# Patient Record
Sex: FEMALE | Race: WHITE | Employment: FULL TIME | URBAN - METROPOLITAN AREA
[De-identification: names, ages, dates, MRNs, and addresses within clinical notes are randomized per-mention and may not be internally consistent; named-entity substitution may affect disease eponyms.]

---

## 2018-02-06 ENCOUNTER — OFFICE VISIT (OUTPATIENT)
Dept: FAMILY MEDICINE CLINIC | Facility: CLINIC | Age: 33
End: 2018-02-06

## 2018-02-06 VITALS
HEIGHT: 67 IN | WEIGHT: 255 LBS | TEMPERATURE: 99 F | OXYGEN SATURATION: 99 % | HEART RATE: 78 BPM | SYSTOLIC BLOOD PRESSURE: 130 MMHG | RESPIRATION RATE: 20 BRPM | DIASTOLIC BLOOD PRESSURE: 64 MMHG | BODY MASS INDEX: 40.02 KG/M2

## 2018-02-06 DIAGNOSIS — J02.9 SORE THROAT: Primary | ICD-10-CM

## 2018-02-06 DIAGNOSIS — J01.00 ACUTE MAXILLARY SINUSITIS, RECURRENCE NOT SPECIFIED: ICD-10-CM

## 2018-02-06 LAB
CONTROL LINE PRESENT WITH A CLEAR BACKGROUND (YES/NO): YES YES/NO
STREP GRP A CUL-SCR: NEGATIVE

## 2018-02-06 PROCEDURE — 99213 OFFICE O/P EST LOW 20 MIN: CPT | Performed by: NURSE PRACTITIONER

## 2018-02-06 PROCEDURE — 87880 STREP A ASSAY W/OPTIC: CPT | Performed by: NURSE PRACTITIONER

## 2018-02-06 RX ORDER — LOSARTAN POTASSIUM 100 MG/1
100 TABLET ORAL DAILY
COMMUNITY

## 2018-02-06 RX ORDER — DOXYCYCLINE HYCLATE 100 MG/1
100 CAPSULE ORAL 2 TIMES DAILY
Qty: 14 CAPSULE | Refills: 0 | Status: SHIPPED | OUTPATIENT
Start: 2018-02-06 | End: 2018-02-13

## 2018-02-06 RX ORDER — POTASSIUM CHLORIDE 750 MG/1
10 TABLET, EXTENDED RELEASE ORAL DAILY
COMMUNITY

## 2018-02-06 NOTE — PATIENT INSTRUCTIONS
·  PLAN: Doxycycline, take as directed. Finish all the medication even if you feel better. · Probiotics or yogurt daily during antibiotic use will help decrease stomach upset and restore good bacteria to the gut.   · May start Claritin or Allegra daily to

## 2018-02-06 NOTE — PROGRESS NOTES
HPI:   Karlene Grewal is a 29year old female who presents with ill symptoms for  2  weeks. Patient reports sore throat, congestion, low grade fever, OTC cold meds have not been helping. Menstrual cycle started earlier than normal, day 11 in cycle.   Has 02/06/18 11:01 AM   Result Value Ref Range   STREP GRP A CUL-SCR negative Negative   Control Line Present with a clear background (yes/no) yes Yes/No   Kit Lot # [id number] Numeric   Kit Expiration Date 08/31/2018 Date         ASSESSMENT AND PLAN:    PLAN: or Ibuprofen over the counter for pain/comfort if not contraindicated. · Follow up in 2 weeks with Dr. Cherylene League, or sooner if worsening symptoms. Follow up if menstrual cycle does not return to \"normal\".  It is normal to have an occasional irregular menses

## 2018-04-06 ENCOUNTER — NURSE ONLY (OUTPATIENT)
Dept: FAMILY MEDICINE CLINIC | Facility: CLINIC | Age: 33
End: 2018-04-06

## 2018-04-06 VITALS
HEART RATE: 89 BPM | TEMPERATURE: 99 F | OXYGEN SATURATION: 99 % | BODY MASS INDEX: 40.02 KG/M2 | DIASTOLIC BLOOD PRESSURE: 70 MMHG | WEIGHT: 255 LBS | RESPIRATION RATE: 20 BRPM | SYSTOLIC BLOOD PRESSURE: 114 MMHG | HEIGHT: 67 IN

## 2018-04-06 DIAGNOSIS — J06.9 URI WITH COUGH AND CONGESTION: Primary | ICD-10-CM

## 2018-04-06 PROCEDURE — 99213 OFFICE O/P EST LOW 20 MIN: CPT | Performed by: NURSE PRACTITIONER

## 2018-04-06 RX ORDER — ALBUTEROL SULFATE 90 UG/1
2 AEROSOL, METERED RESPIRATORY (INHALATION) EVERY 4 HOURS PRN
Qty: 1 INHALER | Refills: 6 | Status: SHIPPED | OUTPATIENT
Start: 2018-04-06 | End: 2018-04-16

## 2018-04-06 NOTE — PROGRESS NOTES
CHIEF COMPLAINT:   Patient presents with:  Chest Congestion: chest feels tight with coughing x 4 days      HPI:   Jason Marinelli is a 29year old female who presents for upper respiratory symptoms for 2 days. Patient reports dry cough.  Symptoms have been THROAT: Oral mucosa pink, moist. Posterior pharynx is not erythematous. no exudates. Tonsils 2/4. NECK: Supple, non-tender  LUNGS: clear to auscultation bilaterally, no wheezes or rhonchi. Breathing is non labored.   CARDIO: RRR without murmur  EXTREMITI You have a viral upper respiratory illness (URI), which is another term for the common cold. This illness is contagious during the first few days. It is spread through the air by coughing and sneezing.  It may also be spread by direct contact (touching the · Cough with lots of colored sputum (mucus)  · Severe headache; face, neck, or ear pain  · Difficulty swallowing due to throat pain  · Fever of 100.4°F (38°C) or higher, or as directed by your healthcare provider  Call 911  Call 911 if any of these occur: 8. If you have been advised to take 2 puffs, wait 5 minutes, then repeat steps 3 to 7 above. Waiting 5 minutes between puffs will allow the medicine to open up your lungs so the second puff can get deeper into the lungs. Replace the cap when done.   9. If y Get prompt medical attention if any of the following occur:  · Increased wheezing or shortness of breath  · Need to use your inhalers more often than usual without relief  · Fever of 100.4°F (38°C) or higher, or as directed by your healthcare provider  · C

## 2018-04-06 NOTE — PATIENT INSTRUCTIONS
Humidifier in room  Sleep propped  Push fluids  Limit dairy  Mucinex as directed  Use inhaler as needed  Follow up in 5 days for worsening symptoms or no improvement    Viral Upper Respiratory Illness (Adult)  You have a viral upper respiratory illness ( · Over-the-counter cold medicines will not shorten the length of time you’re sick, but they may be helpful for the following symptoms: cough, sore throat, and nasal and sinus congestion.  (Note: Do not use decongestants if you have high blood pressure.)  Fo 6. Squeeze the inhaler as you breathe in slowly through your mouth until your lungs are full of air, drawing the medicine deep into your lungs. 7. Hold your breath for 10 seconds, or as long as you can comfortably hold your breath.  Then breathe out slowly If you find that your medicine is not working and you need to use it more often than prescribed, this could be a sign that your asthma is getting worse. Go to the emergency room or urgent care right away.  An asthma attack is easiest to treat in the early s

## 2018-09-05 ENCOUNTER — HOSPITAL ENCOUNTER (EMERGENCY)
Age: 33
Discharge: HOME | End: 2018-09-05
Attending: EMERGENCY MEDICINE
Payer: COMMERCIAL

## 2018-09-05 VITALS
DIASTOLIC BLOOD PRESSURE: 86 MMHG | SYSTOLIC BLOOD PRESSURE: 153 MMHG | OXYGEN SATURATION: 99 % | WEIGHT: 250 LBS | HEIGHT: 67 IN | HEART RATE: 73 BPM | BODY MASS INDEX: 39.24 KG/M2 | RESPIRATION RATE: 18 BRPM | TEMPERATURE: 98 F

## 2018-09-05 DIAGNOSIS — S61.210A LACERATION OF RIGHT INDEX FINGER WITHOUT FOREIGN BODY WITHOUT DAMAGE TO NAIL, INITIAL ENCOUNTER: Primary | ICD-10-CM

## 2018-09-05 PROCEDURE — 99283 EMERGENCY DEPT VISIT LOW MDM: CPT

## 2018-09-05 PROCEDURE — 12001 RPR S/N/AX/GEN/TRNK 2.5CM/<: CPT

## 2018-09-05 PROCEDURE — 99282 EMERGENCY DEPT VISIT SF MDM: CPT

## 2018-09-05 NOTE — ED PROVIDER NOTES
Patient Seen in: [facility] In [place]    History   Patient presents with:  Laceration Abrasion (integumentary)    Stated Complaint: right finger laceration on knife     HPI    Patient is a pleasant 81-year-old female.   She is right-h with excellent strength. Intact sensation. Normal cap refill. Wound thoroughly explored with no evidence of tendon involvement.   Neuro: Cranial nerves intact, Normal Gait    ED Course   Labs Reviewed - No data to display    ED Course as of Sep 05 1310

## 2018-09-05 NOTE — ED PROVIDER NOTES
I reviewed that chart and discussed the case.   I have examined the patient and noted patient is a 51-year-old female who presents emergency room with a history of being right hand dominant who cut the dorsal aspect of her right hand second digit with a kit

## 2019-01-05 ENCOUNTER — APPOINTMENT (OUTPATIENT)
Dept: GENERAL RADIOLOGY | Age: 34
End: 2019-01-05
Attending: EMERGENCY MEDICINE
Payer: COMMERCIAL

## 2019-01-05 ENCOUNTER — HOSPITAL ENCOUNTER (EMERGENCY)
Age: 34
Discharge: HOME | End: 2019-01-05
Attending: EMERGENCY MEDICINE
Payer: COMMERCIAL

## 2019-01-05 VITALS
DIASTOLIC BLOOD PRESSURE: 64 MMHG | TEMPERATURE: 99 F | RESPIRATION RATE: 18 BRPM | HEART RATE: 102 BPM | WEIGHT: 165 LBS | OXYGEN SATURATION: 95 % | SYSTOLIC BLOOD PRESSURE: 136 MMHG | HEIGHT: 67 IN | BODY MASS INDEX: 25.9 KG/M2

## 2019-01-05 DIAGNOSIS — S92.354A CLOSED NONDISPLACED FRACTURE OF FIFTH METATARSAL BONE OF RIGHT FOOT, INITIAL ENCOUNTER: Primary | ICD-10-CM

## 2019-01-05 PROCEDURE — 73630 X-RAY EXAM OF FOOT: CPT | Performed by: EMERGENCY MEDICINE

## 2019-01-05 PROCEDURE — 99284 EMERGENCY DEPT VISIT MOD MDM: CPT

## 2019-01-05 PROCEDURE — 28470 CLTX METATARSAL FX WO MNP EA: CPT

## 2019-01-05 RX ORDER — TRAMADOL HYDROCHLORIDE 50 MG/1
50 TABLET ORAL EVERY 6 HOURS PRN
COMMUNITY
End: 2021-06-08

## 2019-01-05 RX ORDER — MONTELUKAST SODIUM 10 MG/1
10 TABLET ORAL NIGHTLY
COMMUNITY

## 2019-01-05 RX ORDER — GABAPENTIN 100 MG/1
100 CAPSULE ORAL 2 TIMES DAILY
COMMUNITY
End: 2021-06-08

## 2019-01-06 NOTE — ED PROVIDER NOTES
Patient Seen in: [facility] Emergency Department In [place]    History   Patient presents with:  Numbness Weakness (neurologic)  Lower Extremity Injury (musculoskeletal)    Stated Complaint: STARTED EXP NUMBNESS TO THE ALL HER TOES ON HER RIGHT FOOT KNOWN Pulse 102   Temp 99 °F (37.2 °C) (Oral)   Resp 18   Ht 170.2 cm (5' 7\")   Wt 74.8 kg   LMP 12/29/2018   SpO2 95%   BMI 25.84 kg/m²         Physical Exam    Well-developed well-nourished female who sitting on the gurney she is awake and alert she appears i right foot, initial encounter  (primary encounter diagnosis)    Disposition:  Discharge  1/5/2019 10:27 pm    Follow-up:  Rievra Chan           [facility] [address] [place] [address]

## 2019-01-06 NOTE — ED INITIAL ASSESSMENT (HPI)
Pt to the ED for evaluation of numbness to all 5 digits of the R foot. PT states that she broke a metatarsal (unsure which one) but the numbness just began today. Pt has not tried ice or elevation PTA.

## 2020-06-20 ENCOUNTER — HOSPITAL ENCOUNTER (OUTPATIENT)
Age: 35
Discharge: HOME | End: 2020-06-20
Attending: FAMILY MEDICINE
Payer: COMMERCIAL

## 2020-06-20 ENCOUNTER — APPOINTMENT (OUTPATIENT)
Dept: GENERAL RADIOLOGY | Age: 35
End: 2020-06-20
Attending: FAMILY MEDICINE
Payer: COMMERCIAL

## 2020-06-20 VITALS
BODY MASS INDEX: 40.81 KG/M2 | TEMPERATURE: 99 F | SYSTOLIC BLOOD PRESSURE: 157 MMHG | WEIGHT: 260 LBS | RESPIRATION RATE: 18 BRPM | HEIGHT: 67 IN | HEART RATE: 77 BPM | OXYGEN SATURATION: 98 % | DIASTOLIC BLOOD PRESSURE: 74 MMHG

## 2020-06-20 DIAGNOSIS — M84.375A STRESS FRACTURE OF LEFT FOOT, INITIAL ENCOUNTER: Primary | ICD-10-CM

## 2020-06-20 PROCEDURE — 99203 OFFICE O/P NEW LOW 30 MIN: CPT

## 2020-06-20 PROCEDURE — 73630 X-RAY EXAM OF FOOT: CPT | Performed by: FAMILY MEDICINE

## 2020-06-20 PROCEDURE — 99213 OFFICE O/P EST LOW 20 MIN: CPT

## 2020-06-20 RX ORDER — ARM BRACE
EACH MISCELLANEOUS
Qty: 1 EACH | Refills: 0 | Status: SHIPPED | OUTPATIENT
Start: 2020-06-20 | End: 2020-06-20

## 2020-06-20 RX ORDER — NAPROXEN 500 MG/1
500 TABLET ORAL 2 TIMES DAILY PRN
Qty: 20 TABLET | Refills: 0 | Status: SHIPPED | OUTPATIENT
Start: 2020-06-20 | End: 2020-06-30

## 2020-06-20 RX ORDER — ARM BRACE
EACH MISCELLANEOUS
Qty: 1 EACH | Refills: 0 | Status: SHIPPED | OUTPATIENT
Start: 2020-06-20 | End: 2021-06-08

## 2020-06-20 NOTE — ED PROVIDER NOTES
Patient Seen in: [facility] In [place]      History   Patient presents with:   Foot Pain    Stated Complaint: Left ankle injury 1 wk    HPI    74-year-old female with a history of stress fracture to the right foot presents IC secondary to l malleolus. No tenderness on palpation of the heel or forefoot. Patient with tenderness on palpation of the dorsal aspect of the left second, third, and fourth metatarsals.   Achilles is intact with normal Monsivais test.  Full passive and active range of m encounter diagnosis)    Disposition:  Discharge  6/20/2020  1:07 pm    Follow-up:  [address] [address]  Schedule an appointment as soon as possible for a visit             [address] [place] [address]

## 2021-11-17 ENCOUNTER — APPOINTMENT (OUTPATIENT)
Dept: CT IMAGING | Facility: HOSPITAL | Age: 36
End: 2021-11-17
Attending: EMERGENCY MEDICINE
Payer: COMMERCIAL

## 2021-11-17 ENCOUNTER — HOSPITAL ENCOUNTER (OUTPATIENT)
Facility: HOSPITAL | Age: 36
Setting detail: OBSERVATION
Discharge: HOME | End: 2021-11-18
Attending: EMERGENCY MEDICINE | Admitting: HOSPITALIST
Payer: COMMERCIAL

## 2021-11-17 DIAGNOSIS — K52.89 STERCORAL COLITIS: ICD-10-CM

## 2021-11-17 DIAGNOSIS — K56.41 FECAL IMPACTION (HCC): Primary | ICD-10-CM

## 2021-11-17 PROCEDURE — 99220 INITIAL OBSERVATION CARE,LEVL III: CPT | Performed by: INTERNAL MEDICINE

## 2021-11-17 PROCEDURE — 74177 CT ABD & PELVIS W/CONTRAST: CPT | Performed by: EMERGENCY MEDICINE

## 2021-11-17 RX ORDER — HYDROCHLOROTHIAZIDE 25 MG/1
25 TABLET ORAL DAILY
COMMUNITY

## 2021-11-17 RX ORDER — SODIUM CHLORIDE 9 MG/ML
INJECTION, SOLUTION INTRAVENOUS CONTINUOUS
Status: DISCONTINUED | OUTPATIENT
Start: 2021-11-17 | End: 2021-11-18

## 2021-11-17 RX ORDER — OMEPRAZOLE 40 MG/1
40 CAPSULE, DELAYED RELEASE ORAL DAILY
COMMUNITY

## 2021-11-17 RX ORDER — NAPROXEN SODIUM 220 MG
1-2 TABLET ORAL DAILY
COMMUNITY

## 2021-11-17 NOTE — ED INITIAL ASSESSMENT (HPI)
Pt c/o constipation x 1 week. Pt c/o rectal pressure and bloating. Pt states trying laxatives, suppository and enemas at home with no relief. Pt also c/o nausea.

## 2021-11-17 NOTE — ED PROVIDER NOTES
Patient Seen in: [facility] Emergency Department      History   Patient presents with:  Abdomen/Flank Pain    Stated Complaint: constipated, extreme rectal pressure.     Subjective:   HPI    The patient is a 61-year-old female presenting to the emerg BMI 41.50 kg/m²         Physical Exam    General: Comfortable and well appearing. Alert and oriented in no distress. Neuro: No focal neurologic deficits.   Grossly normal and symmetric motor strength and sensation proximally and distally throughout all 4 e Normal   CBC WITH DIFFERENTIAL WITH PLATELET    Narrative: The following orders were created for panel order CBC With Differential With Platelet.   Procedure                               Abnormality         Status                     --------- ADRENALS:  No mass or enlargement. AORTA/VASCULAR:  No aneurysm or dissection. RETROPERITONEUM:  No mass or adenopathy.       BOWEL/MESENTERY:  Large amount of feculent debris is evident in the rectum     consistent with constipation or fecal impa impaction [facility])  (primary encounter diagnosis)  Stercoral colitis     Disposition:  Admit  11/17/2021  8:38 pm    Follow-up:  No follow-up provider specified.         Medications Prescribed:  Current Discharge Medication List                          Hospit

## 2021-11-18 VITALS
TEMPERATURE: 98 F | HEIGHT: 67 IN | OXYGEN SATURATION: 96 % | HEART RATE: 69 BPM | SYSTOLIC BLOOD PRESSURE: 174 MMHG | BODY MASS INDEX: 41.59 KG/M2 | WEIGHT: 265 LBS | RESPIRATION RATE: 18 BRPM | DIASTOLIC BLOOD PRESSURE: 98 MMHG

## 2021-11-18 PROBLEM — K52.89 STERCORAL COLITIS: Status: ACTIVE | Noted: 2021-11-18

## 2021-11-18 PROCEDURE — 99217 OBSERVATION CARE DISCHARGE: CPT | Performed by: HOSPITALIST

## 2021-11-18 RX ORDER — SODIUM PHOSPHATE, DIBASIC AND SODIUM PHOSPHATE, MONOBASIC 7; 19 G/133ML; G/133ML
1 ENEMA RECTAL ONCE AS NEEDED
Status: DISCONTINUED | OUTPATIENT
Start: 2021-11-18 | End: 2021-11-18

## 2021-11-18 RX ORDER — PROCHLORPERAZINE EDISYLATE 5 MG/ML
5 INJECTION INTRAMUSCULAR; INTRAVENOUS EVERY 8 HOURS PRN
Status: DISCONTINUED | OUTPATIENT
Start: 2021-11-18 | End: 2021-11-18

## 2021-11-18 RX ORDER — ACETAMINOPHEN 325 MG/1
650 TABLET ORAL EVERY 6 HOURS PRN
Status: DISCONTINUED | OUTPATIENT
Start: 2021-11-18 | End: 2021-11-18

## 2021-11-18 RX ORDER — PSEUDOEPHEDRINE HCL 30 MG
100 TABLET ORAL 2 TIMES DAILY
Qty: 60 CAPSULE | Refills: 0 | Status: SHIPPED | OUTPATIENT
Start: 2021-11-18 | End: 2021-12-18

## 2021-11-18 RX ORDER — SODIUM CHLORIDE 9 MG/ML
INJECTION, SOLUTION INTRAVENOUS CONTINUOUS
Status: DISCONTINUED | OUTPATIENT
Start: 2021-11-18 | End: 2021-11-18

## 2021-11-18 RX ORDER — MONTELUKAST SODIUM 10 MG/1
10 TABLET ORAL NIGHTLY
Status: DISCONTINUED | OUTPATIENT
Start: 2021-11-18 | End: 2021-11-18

## 2021-11-18 RX ORDER — KETOROLAC TROMETHAMINE 15 MG/ML
15 INJECTION, SOLUTION INTRAMUSCULAR; INTRAVENOUS EVERY 6 HOURS PRN
Status: DISCONTINUED | OUTPATIENT
Start: 2021-11-18 | End: 2021-11-18

## 2021-11-18 RX ORDER — DOCUSATE SODIUM 100 MG/1
100 CAPSULE, LIQUID FILLED ORAL 2 TIMES DAILY
Status: DISCONTINUED | OUTPATIENT
Start: 2021-11-18 | End: 2021-11-18

## 2021-11-18 RX ORDER — LOSARTAN POTASSIUM 100 MG/1
100 TABLET ORAL DAILY
Status: DISCONTINUED | OUTPATIENT
Start: 2021-11-18 | End: 2021-11-18

## 2021-11-18 RX ORDER — ONDANSETRON 2 MG/ML
4 INJECTION INTRAMUSCULAR; INTRAVENOUS EVERY 6 HOURS PRN
Status: DISCONTINUED | OUTPATIENT
Start: 2021-11-18 | End: 2021-11-18

## 2021-11-18 RX ORDER — ENOXAPARIN SODIUM 100 MG/ML
0.5 INJECTION SUBCUTANEOUS DAILY
Status: DISCONTINUED | OUTPATIENT
Start: 2021-11-18 | End: 2021-11-18

## 2021-11-18 RX ORDER — POLYETHYLENE GLYCOL 3350 17 G/17G
17 POWDER, FOR SOLUTION ORAL DAILY PRN
Qty: 30 EACH | Refills: 0 | Status: SHIPPED | OUTPATIENT
Start: 2021-11-18

## 2021-11-18 RX ORDER — BISACODYL 10 MG
10 SUPPOSITORY, RECTAL RECTAL
Status: DISCONTINUED | OUTPATIENT
Start: 2021-11-18 | End: 2021-11-18

## 2021-11-18 RX ORDER — POLYETHYLENE GLYCOL 3350 17 G/17G
17 POWDER, FOR SOLUTION ORAL DAILY
Status: DISCONTINUED | OUTPATIENT
Start: 2021-11-18 | End: 2021-11-18

## 2021-11-18 NOTE — PROGRESS NOTES
NURSING ADMISSION NOTE      Patient admitted via Cart from ED. Oriented to room. Safety precautions initiated. Bed in low position. Call light in reach. Pt alert and oriented x4. Admission navigator complete. Discussed POC.  All questions answere

## 2021-11-18 NOTE — DISCHARGE SUMMARY
[facility] HOSPITALIST  DISCHARGE SUMMARY     Hi Ring Patient Status:  Observation    1985 MRN [id number]   [facility] 3NW-A Attending Ruiz Stephens DO   Hosp Day # 0 PCP Tena Gupta DO     Date of Admission: 2021  Sergei recommendations (brief descriptions):  • None    Lab/Test results pending at Discharge:   · None    Consultants:  • GI    Discharge Medication List:     Discharge Medications      START taking these medications      Instructions Prescription details   docu -----------------------------------------------------------------------------------------------  PATIENT DISCHARGE INSTRUCTIONS: See electronic chart    Peter Geiger DO    Time spent:  > 30 minutes

## 2021-11-18 NOTE — CONSULTS
Gastroenterology Initial Consultation  I have personally seen and examined the patient.     Patient Name: Radha Steiner  Referring physician: Dr. B[facility]  Reason for consultation: Constipation  CC: Constipatoin  HPI: This is a 30 yo woman with a history injection 15 mg, 15 mg, Intravenous, Q6H PRN  ondansetron (ZOFRAN) injection 4 mg, 4 mg, Intravenous, Q6H PRN  Prochlorperazine Edisylate (COMPAZINE) injection 5 mg, 5 mg, Intravenous, Q8H PRN  docusate sodium (COLACE) cap 100 mg, 100 mg, Oral, BID  polyet recurrent urinary tract infections, hematuria, dysuria, or nephrolithiasis           Psychiatric: The patient reports no history of depression, anxiety, suicidal ideation, or homicidal ideation           Oncologic: The patient reports on history of prior s Recent Labs   Lab 11/17/21  1428 11/18/21  0541   GLU 90 77   BUN 7 8   CREATSERUM 0.73 0.60   GFRAA 124 138   GFRNAA 108 119   CA 9.3 9.0    140   K 3.7 4.1    107   CO2 26.0 29.0     Recent Labs   Lab 11/18/21  0541   RBC 4.44   HGB 13.6 consistent with proctitis which could be stercoral proctitis.    ABDOMINAL WALL:  No mass or hernia.     URINARY BLADDER:  No visible focal wall thickening, lesion, or calculus.     PELVIC NODES:  No adenopathy.     PELVIC ORGANS:  No visible mass.  Pelvic

## 2021-11-18 NOTE — ED QUICK NOTES
Orders for admission, patient is aware of plan and ready to go upstairs. Any questions, please call ED RN Catrina Nelson  at extension [phone].      Vaccinated?unknown  Type of COVID test sent:  COVID Suspicion level:Negative Low/High      Titratable drug(s) infusing

## 2021-11-18 NOTE — PLAN OF CARE
1735 Pt had mult bowel movements to the point per pt she is now just passing clear liquid. Pt states feeling \"great relief\"  Pt able to eat regular diet for dinner, no complaints of pain or nausea. GI stated okay for discharge.   Dr. Nikki Rabago has been me injury  Description: INTERVENTIONS:  - Assess pt frequently for physical needs  - Identify cognitive and physical deficits and behaviors that affect risk of falls.   - [place] fall precautions as indicated by assessment.  - Educate pt/family on patient sa

## 2021-11-18 NOTE — H&P
[facility] HOSPITALIST  History and Physical     Missael Stanley Patient Status:  Emergency    1985 MRN [id number]   Location [facility] Attending WillLynda MD   Hosp Day # 0 PCP Narcisa Valdez DO     Chief Complain Cancer Maternal Aunt         Thyroid. Mothers half sibling     Allergies:   Ceclor [Cefaclor]       SWELLING    Medications:  No current facility-administered medications on file prior to encounter.   Montelukast Sodium 10 MG Oral Tab, Take 10 mg by mouth n CO2 26.0   ALKPHO 58   AST 13*   ALT 31   BILT 0.5   TP 7.5     Estimated Creatinine Clearance: 105.6 mL/min (based on SCr of 0.73 mg/dL). No results for input(s): PTP, INR in the last 168 hours.     COVID-19 Lab Results  COVID-19  No results found for

## 2021-11-18 NOTE — PLAN OF CARE
Pt alert and oriented x4. On RA. BP elevated, administered pts home BP meds and PRN pain medication. Reports pain/pressure to lower back. Currently NPO. Constipated, with no result after ED enemas and scheduled miralax on floor. GI to eval pt this morning. including physical limitations  - Instruct pt to call for assistance with activity based on assessment  - Modify environment to reduce risk of injury  - Provide assistive devices as appropriate  - Consider OT/PT consult to assist with strengthening/mobilit

## 2021-11-18 NOTE — PROGRESS NOTES
[facility]     Hospitalist Progress Note     Yaquelin Culver Patient Status:  Observation    1985 MRN [id number]   [facility] 3NW-A Attending Dneia Mtz, [address] Day # 0 PCP Karl Wyman DO     Chief Complaint: Abdominal CRP, JAMES, LDH, DDIMER in the last 168 hours. Imaging: Imaging data reviewed in Epic.     Medications:   • montelukast  10 mg Oral Nightly   • docusate sodium  100 mg Oral BID   • polyethylene glycol (PEG 3350)  17 g Oral Daily   • losartan  100 mg Oral D

## 2021-11-19 NOTE — PLAN OF CARE
Pt discharged home in stable condition. Pt had mult Bms after drinking about 3/4 of go lytely. Pt stated she was just having clear liquid toward end and that she felt \"great relief\".   Pt able to tolerate regular diet for dinner prior to dsicharge and h Progressing     Problem: SAFETY ADULT - FALL  Goal: Free from fall injury  Description: INTERVENTIONS:  - Assess pt frequently for physical needs  - Identify cognitive and physical deficits and behaviors that affect risk of falls.   - [place] fall precaut

## 2022-07-16 ENCOUNTER — HOSPITAL ENCOUNTER (EMERGENCY)
Age: 37
Discharge: HOME | End: 2022-07-16
Attending: EMERGENCY MEDICINE
Payer: COMMERCIAL

## 2022-07-16 ENCOUNTER — APPOINTMENT (OUTPATIENT)
Dept: CT IMAGING | Age: 37
End: 2022-07-16
Attending: EMERGENCY MEDICINE
Payer: COMMERCIAL

## 2022-07-16 VITALS
WEIGHT: 264.56 LBS | BODY MASS INDEX: 41 KG/M2 | RESPIRATION RATE: 16 BRPM | SYSTOLIC BLOOD PRESSURE: 151 MMHG | HEART RATE: 97 BPM | TEMPERATURE: 98 F | DIASTOLIC BLOOD PRESSURE: 89 MMHG | OXYGEN SATURATION: 100 %

## 2022-07-16 DIAGNOSIS — I10 HYPERTENSION, UNSPECIFIED TYPE: Primary | ICD-10-CM

## 2022-07-16 PROCEDURE — 99284 EMERGENCY DEPT VISIT MOD MDM: CPT

## 2022-07-16 PROCEDURE — 70450 CT HEAD/BRAIN W/O DYE: CPT | Performed by: EMERGENCY MEDICINE

## 2022-07-16 RX ORDER — AMLODIPINE BESYLATE 5 MG/1
5 TABLET ORAL ONCE
Status: COMPLETED | OUTPATIENT
Start: 2022-07-16 | End: 2022-07-16

## 2022-12-27 ENCOUNTER — WALK IN (OUTPATIENT)
Dept: URGENT CARE | Age: 37
End: 2022-12-27
Attending: EMERGENCY MEDICINE

## 2022-12-27 VITALS
OXYGEN SATURATION: 99 % | RESPIRATION RATE: 18 BRPM | DIASTOLIC BLOOD PRESSURE: 90 MMHG | HEART RATE: 90 BPM | SYSTOLIC BLOOD PRESSURE: 155 MMHG | WEIGHT: 260 LBS | TEMPERATURE: 97.7 F

## 2022-12-27 DIAGNOSIS — Z87.09 HISTORY OF ASTHMA: ICD-10-CM

## 2022-12-27 DIAGNOSIS — Z20.828 EXPOSURE TO INFLUENZA: ICD-10-CM

## 2022-12-27 DIAGNOSIS — J06.9 UPPER RESPIRATORY TRACT INFECTION, UNSPECIFIED TYPE: Primary | ICD-10-CM

## 2022-12-27 LAB
FLUAV RNA RESP QL NAA+PROBE: POSITIVE
FLUBV RNA RESP QL NAA+PROBE: NOT DETECTED
RSV AG NPH QL IA.RAPID: NOT DETECTED
SARS-COV-2 RNA RESP QL NAA+PROBE: NOT DETECTED

## 2022-12-27 PROCEDURE — 99202 OFFICE O/P NEW SF 15 MIN: CPT

## 2022-12-27 PROCEDURE — G0463 HOSPITAL OUTPT CLINIC VISIT: HCPCS

## 2022-12-27 PROCEDURE — C9803 HOPD COVID-19 SPEC COLLECT: HCPCS

## 2022-12-27 PROCEDURE — 0241U POCT COVID/FLU/RSV PANEL: CPT | Performed by: EMERGENCY MEDICINE

## 2022-12-27 RX ORDER — POTASSIUM CHLORIDE 750 MG/1
TABLET, EXTENDED RELEASE ORAL
COMMUNITY

## 2022-12-27 RX ORDER — LEVALBUTEROL TARTRATE 45 UG/1
AEROSOL, METERED ORAL
COMMUNITY

## 2022-12-27 RX ORDER — MONTELUKAST SODIUM 10 MG/1
TABLET ORAL
COMMUNITY

## 2022-12-27 RX ORDER — OSELTAMIVIR PHOSPHATE 75 MG/1
75 CAPSULE ORAL 2 TIMES DAILY
Qty: 10 CAPSULE | Refills: 0 | Status: SHIPPED | OUTPATIENT
Start: 2022-12-27 | End: 2023-01-01

## 2022-12-27 RX ORDER — OMEPRAZOLE 40 MG/1
CAPSULE, DELAYED RELEASE ORAL
COMMUNITY
End: 2023-08-18 | Stop reason: ALTCHOICE

## 2022-12-27 RX ORDER — LOSARTAN POTASSIUM 100 MG/1
TABLET ORAL
COMMUNITY

## 2022-12-27 RX ORDER — METHYLPREDNISOLONE 4 MG/1
4 TABLET ORAL SEE ADMIN INSTRUCTIONS
Qty: 21 TABLET | Refills: 0 | Status: SHIPPED | OUTPATIENT
Start: 2022-12-27 | End: 2023-08-18 | Stop reason: ALTCHOICE

## 2022-12-27 ASSESSMENT — PAIN SCALES - GENERAL
PAINLEVEL: 5
PAINLEVEL_OUTOF10: 0

## 2023-04-18 ENCOUNTER — APPOINTMENT (OUTPATIENT)
Dept: CT IMAGING | Age: 38
End: 2023-04-18
Attending: EMERGENCY MEDICINE

## 2023-04-18 ENCOUNTER — HOSPITAL ENCOUNTER (EMERGENCY)
Age: 38
Discharge: HOME | End: 2023-04-18
Attending: EMERGENCY MEDICINE

## 2023-04-18 VITALS
HEART RATE: 80 BPM | OXYGEN SATURATION: 98 % | DIASTOLIC BLOOD PRESSURE: 89 MMHG | RESPIRATION RATE: 16 BRPM | HEIGHT: 67 IN | SYSTOLIC BLOOD PRESSURE: 141 MMHG | BODY MASS INDEX: 42.8 KG/M2 | TEMPERATURE: 97.9 F | WEIGHT: 272.71 LBS

## 2023-04-18 DIAGNOSIS — N30.90 CYSTITIS: Primary | ICD-10-CM

## 2023-04-18 LAB
ALBUMIN SERPL-MCNC: 3.8 G/DL (ref 3.6–5.1)
ALBUMIN/GLOB SERPL: 1.1 {RATIO} (ref 1–2.4)
ALP SERPL-CCNC: 61 UNITS/L (ref 45–117)
ALT SERPL-CCNC: 24 UNITS/L
ANION GAP SERPL CALC-SCNC: 4 MMOL/L (ref 7–19)
APPEARANCE UR: ABNORMAL
AST SERPL-CCNC: 12 UNITS/L
BACTERIA #/AREA URNS HPF: ABNORMAL /HPF
BASOPHILS # BLD: 0 K/MCL (ref 0–0.3)
BASOPHILS NFR BLD: 0 %
BILIRUB SERPL-MCNC: 0.3 MG/DL (ref 0.2–1)
BILIRUB UR QL STRIP: NEGATIVE
BUN SERPL-MCNC: 16 MG/DL (ref 6–20)
BUN/CREAT SERPL: 26 (ref 7–25)
CALCIUM SERPL-MCNC: 9.3 MG/DL (ref 8.4–10.2)
CHLORIDE SERPL-SCNC: 104 MMOL/L (ref 97–110)
CO2 SERPL-SCNC: 32 MMOL/L (ref 21–32)
COLOR UR: ABNORMAL
CREAT SERPL-MCNC: 0.62 MG/DL (ref 0.51–0.95)
DEPRECATED RDW RBC: 40.4 FL (ref 39–50)
EOSINOPHIL # BLD: 0.1 K/MCL (ref 0–0.5)
EOSINOPHIL NFR BLD: 0 %
ERYTHROCYTE [DISTWIDTH] IN BLOOD: 11.8 % (ref 11–15)
FASTING DURATION TIME PATIENT: ABNORMAL H
GFR SERPLBLD BASED ON 1.73 SQ M-ARVRAT: >90 ML/MIN
GLOBULIN SER-MCNC: 3.4 G/DL (ref 2–4)
GLUCOSE SERPL-MCNC: 102 MG/DL (ref 70–99)
GLUCOSE UR STRIP-MCNC: NEGATIVE MG/DL
HCG UR QL: NEGATIVE
HCT VFR BLD CALC: 38.4 % (ref 36–46.5)
HGB BLD-MCNC: 12.7 G/DL (ref 12–15.5)
HGB UR QL STRIP: ABNORMAL
HYALINE CASTS #/AREA URNS LPF: ABNORMAL /LPF
IMM GRANULOCYTES # BLD AUTO: 0.1 K/MCL (ref 0–0.2)
IMM GRANULOCYTES # BLD: 0 %
KETONES UR STRIP-MCNC: NEGATIVE MG/DL
LEUKOCYTE ESTERASE UR QL STRIP: ABNORMAL
LYMPHOCYTES # BLD: 2.6 K/MCL (ref 1–4.8)
LYMPHOCYTES NFR BLD: 19 %
MCH RBC QN AUTO: 30.8 PG (ref 26–34)
MCHC RBC AUTO-ENTMCNC: 33.1 G/DL (ref 32–36.5)
MCV RBC AUTO: 93.2 FL (ref 78–100)
MONOCYTES # BLD: 0.7 K/MCL (ref 0.3–0.9)
MONOCYTES NFR BLD: 5 %
NEUTROPHILS # BLD: 10.6 K/MCL (ref 1.8–7.7)
NEUTROPHILS NFR BLD: 76 %
NITRITE UR QL STRIP: POSITIVE
NRBC BLD MANUAL-RTO: 0 /100 WBC
PH UR STRIP: 7 [PH] (ref 5–7)
PLATELET # BLD AUTO: 411 K/MCL (ref 140–450)
POTASSIUM SERPL-SCNC: 3.3 MMOL/L (ref 3.4–5.1)
PROT SERPL-MCNC: 7.2 G/DL (ref 6.4–8.2)
PROT UR STRIP-MCNC: NEGATIVE MG/DL
RAINBOW EXTRA TUBES HOLD SPECIMEN: NORMAL
RBC # BLD: 4.12 MIL/MCL (ref 4–5.2)
RBC #/AREA URNS HPF: >100 /HPF
SODIUM SERPL-SCNC: 137 MMOL/L (ref 135–145)
SP GR UR STRIP: 1.01 (ref 1–1.03)
SQUAMOUS #/AREA URNS HPF: ABNORMAL /HPF
UROBILINOGEN UR STRIP-MCNC: 0.2 MG/DL
WBC # BLD: 13.9 K/MCL (ref 4.2–11)
WBC #/AREA URNS HPF: ABNORMAL /HPF

## 2023-04-18 PROCEDURE — 96374 THER/PROPH/DIAG INJ IV PUSH: CPT

## 2023-04-18 PROCEDURE — 10002803 HB RX 637: Performed by: EMERGENCY MEDICINE

## 2023-04-18 PROCEDURE — 10002800 HB RX 250 W HCPCS: Performed by: EMERGENCY MEDICINE

## 2023-04-18 PROCEDURE — 84703 CHORIONIC GONADOTROPIN ASSAY: CPT

## 2023-04-18 PROCEDURE — 81001 URINALYSIS AUTO W/SCOPE: CPT | Performed by: EMERGENCY MEDICINE

## 2023-04-18 PROCEDURE — 10002807 HB RX 258: Performed by: EMERGENCY MEDICINE

## 2023-04-18 PROCEDURE — 10002805 HB CONTRAST AGENT: Performed by: EMERGENCY MEDICINE

## 2023-04-18 PROCEDURE — 85025 COMPLETE CBC W/AUTO DIFF WBC: CPT | Performed by: EMERGENCY MEDICINE

## 2023-04-18 PROCEDURE — G1004 CDSM NDSC: HCPCS

## 2023-04-18 PROCEDURE — 74177 CT ABD & PELVIS W/CONTRAST: CPT

## 2023-04-18 PROCEDURE — 80053 COMPREHEN METABOLIC PANEL: CPT | Performed by: EMERGENCY MEDICINE

## 2023-04-18 PROCEDURE — 96361 HYDRATE IV INFUSION ADD-ON: CPT

## 2023-04-18 PROCEDURE — 87077 CULTURE AEROBIC IDENTIFY: CPT | Performed by: EMERGENCY MEDICINE

## 2023-04-18 PROCEDURE — 99284 EMERGENCY DEPT VISIT MOD MDM: CPT

## 2023-04-18 PROCEDURE — 10004651 HB RX, NO CHARGE ITEM: Performed by: EMERGENCY MEDICINE

## 2023-04-18 PROCEDURE — 96375 TX/PRO/DX INJ NEW DRUG ADDON: CPT

## 2023-04-18 RX ORDER — CEPHALEXIN 500 MG/1
500 CAPSULE ORAL 2 TIMES DAILY
Qty: 14 CAPSULE | Refills: 0 | Status: SHIPPED | OUTPATIENT
Start: 2023-04-18 | End: 2023-04-20 | Stop reason: HOSPADM

## 2023-04-18 RX ORDER — ACETAMINOPHEN 500 MG
1000 TABLET ORAL ONCE
Status: COMPLETED | OUTPATIENT
Start: 2023-04-18 | End: 2023-04-18

## 2023-04-18 RX ORDER — KETOROLAC TROMETHAMINE 15 MG/ML
15 INJECTION, SOLUTION INTRAMUSCULAR; INTRAVENOUS ONCE
Status: COMPLETED | OUTPATIENT
Start: 2023-04-18 | End: 2023-04-18

## 2023-04-18 RX ORDER — PHENAZOPYRIDINE HYDROCHLORIDE 200 MG/1
200 TABLET, FILM COATED ORAL ONCE
Status: COMPLETED | OUTPATIENT
Start: 2023-04-18 | End: 2023-04-18

## 2023-04-18 RX ORDER — PHENAZOPYRIDINE HYDROCHLORIDE 200 MG/1
200 TABLET, FILM COATED ORAL 3 TIMES DAILY
Qty: 6 TABLET | Refills: 0 | Status: SHIPPED | OUTPATIENT
Start: 2023-04-18 | End: 2023-04-20

## 2023-04-18 RX ADMIN — PHENAZOPYRIDINE HYDROCHLORIDE 200 MG: 200 TABLET ORAL at 17:51

## 2023-04-18 RX ADMIN — SODIUM CHLORIDE 1000 ML: 9 INJECTION, SOLUTION INTRAVENOUS at 17:50

## 2023-04-18 RX ADMIN — IOHEXOL 80 ML: 350 INJECTION, SOLUTION INTRAVENOUS at 18:07

## 2023-04-18 RX ADMIN — CEFAZOLIN SODIUM 2000 MG: 300 INJECTION, POWDER, LYOPHILIZED, FOR SOLUTION INTRAVENOUS at 20:06

## 2023-04-18 RX ADMIN — ACETAMINOPHEN 1000 MG: 500 TABLET ORAL at 17:50

## 2023-04-18 RX ADMIN — KETOROLAC TROMETHAMINE 15 MG: 15 INJECTION, SOLUTION INTRAMUSCULAR; INTRAVENOUS at 17:51

## 2023-04-18 ASSESSMENT — PAIN SCALES - GENERAL
PAINLEVEL_OUTOF10: 5
PAINLEVEL_OUTOF10: 7

## 2023-04-20 LAB — BACTERIA UR CULT: ABNORMAL

## 2023-04-20 RX ORDER — NITROFURANTOIN 25; 75 MG/1; MG/1
100 CAPSULE ORAL 2 TIMES DAILY
Qty: 10 CAPSULE | Refills: 0 | Status: SHIPPED | OUTPATIENT
Start: 2023-04-20 | End: 2023-04-25

## 2023-08-11 ENCOUNTER — TELEPHONE (OUTPATIENT)
Dept: OBGYN | Age: 38
End: 2023-08-11

## 2023-08-18 ENCOUNTER — OFFICE VISIT (OUTPATIENT)
Dept: OBGYN | Age: 38
End: 2023-08-18

## 2023-08-18 VITALS
HEART RATE: 87 BPM | DIASTOLIC BLOOD PRESSURE: 92 MMHG | RESPIRATION RATE: 18 BRPM | SYSTOLIC BLOOD PRESSURE: 140 MMHG | WEIGHT: 270.39 LBS | OXYGEN SATURATION: 100 % | BODY MASS INDEX: 42.44 KG/M2 | HEIGHT: 67 IN

## 2023-08-18 DIAGNOSIS — N92.6 IRREGULAR PERIODS/MENSTRUAL CYCLES: Primary | ICD-10-CM

## 2023-08-18 PROCEDURE — 99203 OFFICE O/P NEW LOW 30 MIN: CPT | Performed by: LEGAL MEDICINE

## 2023-08-18 RX ORDER — CYCLOBENZAPRINE HCL 10 MG
TABLET ORAL
COMMUNITY

## 2023-08-18 RX ORDER — ALPRAZOLAM 0.25 MG/1
TABLET ORAL
COMMUNITY
Start: 2023-06-10

## 2023-08-18 RX ORDER — HYDROCHLOROTHIAZIDE 25 MG/1
25 TABLET ORAL DAILY
COMMUNITY
Start: 2023-06-25

## 2024-05-27 ENCOUNTER — HOSPITAL ENCOUNTER (EMERGENCY)
Facility: HOSPITAL | Age: 39
Discharge: HOME | End: 2024-05-28
Attending: EMERGENCY MEDICINE

## 2024-05-27 ENCOUNTER — APPOINTMENT (OUTPATIENT)
Dept: ULTRASOUND IMAGING | Facility: HOSPITAL | Age: 39
End: 2024-05-27
Attending: EMERGENCY MEDICINE

## 2024-05-27 DIAGNOSIS — N94.6 DYSMENORRHEA: ICD-10-CM

## 2024-05-27 DIAGNOSIS — N92.1 MENORRHAGIA WITH IRREGULAR CYCLE: ICD-10-CM

## 2024-05-27 DIAGNOSIS — R10.31 RLQ ABDOMINAL PAIN: Primary | ICD-10-CM

## 2024-05-27 LAB
ALBUMIN SERPL-MCNC: 4.1 G/DL (ref 3.4–5)
ALBUMIN/GLOB SERPL: 1.2 {RATIO} (ref 1–2)
ALP LIVER SERPL-CCNC: 66 U/L
ALT SERPL-CCNC: 24 U/L
ANION GAP SERPL CALC-SCNC: 7 MMOL/L (ref 0–18)
AST SERPL-CCNC: 20 U/L (ref 15–37)
BASOPHILS # BLD AUTO: 0.03 X10(3) UL (ref 0–0.2)
BASOPHILS NFR BLD AUTO: 0.3 %
BILIRUB SERPL-MCNC: 0.3 MG/DL (ref 0.1–2)
BUN BLD-MCNC: 16 MG/DL (ref 9–23)
CALCIUM BLD-MCNC: 9 MG/DL (ref 8.5–10.1)
CHLORIDE SERPL-SCNC: 107 MMOL/L (ref 98–112)
CO2 SERPL-SCNC: 25 MMOL/L (ref 21–32)
CREAT BLD-MCNC: 0.72 MG/DL
EGFRCR SERPLBLD CKD-EPI 2021: 111 ML/MIN/1.73M2 (ref 60–?)
EOSINOPHIL # BLD AUTO: 0.26 X10(3) UL (ref 0–0.7)
EOSINOPHIL NFR BLD AUTO: 2.7 %
ERYTHROCYTE [DISTWIDTH] IN BLOOD BY AUTOMATED COUNT: 13.3 %
GLOBULIN PLAS-MCNC: 3.4 G/DL (ref 2.8–4.4)
GLUCOSE BLD-MCNC: 86 MG/DL (ref 70–99)
HCG SERPL QL: NEGATIVE
HCT VFR BLD AUTO: 31.8 %
HGB BLD-MCNC: 9.7 G/DL
IMM GRANULOCYTES # BLD AUTO: 0.03 X10(3) UL (ref 0–1)
IMM GRANULOCYTES NFR BLD: 0.3 %
LIPASE SERPL-CCNC: 22 U/L (ref 13–75)
LYMPHOCYTES # BLD AUTO: 3.13 X10(3) UL (ref 1–4)
LYMPHOCYTES NFR BLD AUTO: 32.8 %
MCH RBC QN AUTO: 25 PG (ref 26–34)
MCHC RBC AUTO-ENTMCNC: 30.5 G/DL (ref 31–37)
MCV RBC AUTO: 82 FL
MONOCYTES # BLD AUTO: 0.74 X10(3) UL (ref 0.1–1)
MONOCYTES NFR BLD AUTO: 7.8 %
NEUTROPHILS # BLD AUTO: 5.34 X10 (3) UL (ref 1.5–7.7)
NEUTROPHILS # BLD AUTO: 5.34 X10(3) UL (ref 1.5–7.7)
NEUTROPHILS NFR BLD AUTO: 56.1 %
OSMOLALITY SERPL CALC.SUM OF ELEC: 288 MOSM/KG (ref 275–295)
PLATELET # BLD AUTO: 508 10(3)UL (ref 150–450)
POTASSIUM SERPL-SCNC: 3.5 MMOL/L (ref 3.5–5.1)
PROT SERPL-MCNC: 7.5 G/DL (ref 6.4–8.2)
RBC # BLD AUTO: 3.88 X10(6)UL
SODIUM SERPL-SCNC: 139 MMOL/L (ref 136–145)
WBC # BLD AUTO: 9.5 X10(3) UL (ref 4–11)

## 2024-05-27 PROCEDURE — 80053 COMPREHEN METABOLIC PANEL: CPT

## 2024-05-27 PROCEDURE — 96361 HYDRATE IV INFUSION ADD-ON: CPT

## 2024-05-27 PROCEDURE — 84703 CHORIONIC GONADOTROPIN ASSAY: CPT | Performed by: EMERGENCY MEDICINE

## 2024-05-27 PROCEDURE — 80053 COMPREHEN METABOLIC PANEL: CPT | Performed by: EMERGENCY MEDICINE

## 2024-05-27 PROCEDURE — 99285 EMERGENCY DEPT VISIT HI MDM: CPT

## 2024-05-27 PROCEDURE — 96376 TX/PRO/DX INJ SAME DRUG ADON: CPT

## 2024-05-27 PROCEDURE — 93975 VASCULAR STUDY: CPT | Performed by: EMERGENCY MEDICINE

## 2024-05-27 PROCEDURE — 76830 TRANSVAGINAL US NON-OB: CPT | Performed by: EMERGENCY MEDICINE

## 2024-05-27 PROCEDURE — 83690 ASSAY OF LIPASE: CPT

## 2024-05-27 PROCEDURE — 85025 COMPLETE CBC W/AUTO DIFF WBC: CPT

## 2024-05-27 PROCEDURE — 85025 COMPLETE CBC W/AUTO DIFF WBC: CPT | Performed by: EMERGENCY MEDICINE

## 2024-05-27 PROCEDURE — 96374 THER/PROPH/DIAG INJ IV PUSH: CPT

## 2024-05-27 PROCEDURE — 96375 TX/PRO/DX INJ NEW DRUG ADDON: CPT

## 2024-05-27 PROCEDURE — 76856 US EXAM PELVIC COMPLETE: CPT | Performed by: EMERGENCY MEDICINE

## 2024-05-27 PROCEDURE — 83690 ASSAY OF LIPASE: CPT | Performed by: EMERGENCY MEDICINE

## 2024-05-27 RX ORDER — MORPHINE SULFATE 2 MG/ML
2 INJECTION, SOLUTION INTRAMUSCULAR; INTRAVENOUS ONCE
Status: COMPLETED | OUTPATIENT
Start: 2024-05-27 | End: 2024-05-28

## 2024-05-27 RX ORDER — KETOROLAC TROMETHAMINE 30 MG/ML
30 INJECTION, SOLUTION INTRAMUSCULAR; INTRAVENOUS ONCE
Status: COMPLETED | OUTPATIENT
Start: 2024-05-27 | End: 2024-05-27

## 2024-05-27 RX ORDER — ONDANSETRON 2 MG/ML
4 INJECTION INTRAMUSCULAR; INTRAVENOUS ONCE
Status: COMPLETED | OUTPATIENT
Start: 2024-05-27 | End: 2024-05-28

## 2024-05-28 ENCOUNTER — APPOINTMENT (OUTPATIENT)
Dept: CT IMAGING | Facility: HOSPITAL | Age: 39
End: 2024-05-28
Attending: EMERGENCY MEDICINE

## 2024-05-28 VITALS
BODY MASS INDEX: 42.38 KG/M2 | WEIGHT: 270 LBS | TEMPERATURE: 98 F | SYSTOLIC BLOOD PRESSURE: 155 MMHG | DIASTOLIC BLOOD PRESSURE: 60 MMHG | RESPIRATION RATE: 17 BRPM | HEIGHT: 67 IN | OXYGEN SATURATION: 99 % | HEART RATE: 81 BPM

## 2024-05-28 LAB
HCT VFR BLD AUTO: 30.1 %
HGB BLD-MCNC: 9.1 G/DL

## 2024-05-28 PROCEDURE — 85018 HEMOGLOBIN: CPT | Performed by: EMERGENCY MEDICINE

## 2024-05-28 PROCEDURE — 74177 CT ABD & PELVIS W/CONTRAST: CPT | Performed by: EMERGENCY MEDICINE

## 2024-05-28 PROCEDURE — 85014 HEMATOCRIT: CPT | Performed by: EMERGENCY MEDICINE

## 2024-05-28 RX ORDER — MORPHINE SULFATE 2 MG/ML
2 INJECTION, SOLUTION INTRAMUSCULAR; INTRAVENOUS ONCE
Status: COMPLETED | OUTPATIENT
Start: 2024-05-28 | End: 2024-05-28

## 2024-05-28 NOTE — ED PROVIDER NOTES
Patient Seen in: [facility] Emergency Department      History     Chief Complaint   Patient presents with    Abdomen/Flank Pain     Stated Complaint: RLQ abdominal pain    Subjective:   HPI  Patient is a 38-year-old female with a history of asthma, hypertension who presents the ED for evaluation of right lower quadrant abdominal pain starting this evening just prior to arrival.  Patient reports that she started her menstrual cycle on Friday.  She has been using 3-4 pads daily.  Patient states that her periods are irregular and can last up to 2 weeks.  She notes she has had crampy lower abdominal pain but suddenly developed increased severe right lower quadrant pain and pressure in her lower back this evening.  Patient also states that she typically will have light bleeding and then all of a sudden have a lot of clots soaking through a pad. No fevers, chills, n/v, dysuria, vaginal discharge. No other bleeding. Pt not on blood thinners.     Objective:   Past Medical History:    Asthma (HCC)    Back pain    Essential hypertension              Past Surgical History:   Procedure Laterality Date          Foot surgery Bilateral 2021                Social History     Socioeconomic History    Marital status:    Tobacco Use    Smoking status: Never    Smokeless tobacco: Never   Vaping Use    Vaping status: Never Used   Substance and Sexual Activity    Alcohol use: Yes     Comment: Socially    Drug use: No    Sexual activity: Not Currently              Review of Systems    Positive for stated complaint: RLQ abdominal pain  Other systems are as noted in HPI.  Constitutional and vital signs reviewed.      All other systems reviewed and negative except as noted above.    Physical Exam     ED Triage Vitals [24]   /88   Pulse 90   Resp 18   Temp 97.7 °F (36.5 °C)   Temp src Temporal   SpO2 100 %   O2 Device None (Room air)       Current Vitals:   Vital Signs  BP: 155/60  Pulse:  81  Resp: 17  Temp: 97.7 °F (36.5 °C)  Temp src: Temporal  MAP (mmHg): 85    Oxygen Therapy  SpO2: 99 %  O2 Device: None (Room air)            Physical Exam  Vitals and nursing note reviewed.   Constitutional:       General: She is not in acute distress.     Appearance: She is not ill-appearing.   HENT:      Head: Normocephalic and atraumatic.      Mouth/Throat:      Mouth: Mucous membranes are moist.   Eyes:      Extraocular Movements: Extraocular movements intact.      Pupils: Pupils are equal, round, and reactive to light.   Cardiovascular:      Rate and Rhythm: Normal rate and regular rhythm.   Pulmonary:      Effort: Pulmonary effort is normal.   Abdominal:      General: There is no distension.      Palpations: Abdomen is soft.      Tenderness: There is abdominal tenderness in the right lower quadrant. There is no right CVA tenderness or left CVA tenderness.   Musculoskeletal:        Arms:       Cervical back: Neck supple.      Right lower leg: No edema.      Left lower leg: No edema.   Skin:     General: Skin is warm and dry.      Capillary Refill: Capillary refill takes less than 2 seconds.   Neurological:      General: No focal deficit present.      Mental Status: She is alert.   Psychiatric:         Mood and Affect: Mood normal.           ED Course     Labs Reviewed   HEMOGLOBIN + HEMATOCRIT - Abnormal; Notable for the following components:       Result Value    HGB 9.1 (*)     HCT 30.1 (*)     All other components within normal limits   CBC W/ DIFFERENTIAL - Abnormal; Notable for the following components:    HGB 9.7 (*)     HCT 31.8 (*)     .0 (*)     MCH 25.0 (*)     MCHC 30.5 (*)     All other components within normal limits   COMP METABOLIC PANEL (14) - Normal   LIPASE - Normal   HCG, BETA SUBUNIT, QUAL - Normal   CBC WITH DIFFERENTIAL WITH PLATELET    Narrative:     The following orders were created for panel order CBC With Differential With Platelet.  Procedure                                Abnormality         Status                     ---------                               -----------         ------                     CBC W/ DIFFERENTIAL[[id number]]          Abnormal            Final result                 Please view results for these tests on the individual orders.   RAINBOW DRAW LAVENDER   RAINBOW DRAW LIGHT GREEN          ED Course as of 05/28/24 1409  ------------------------------------------------------------  Time: 05/28 0000  Comment: Patient reports a lot of vaginal bleeding at ultrasound.  Pelvic exam performed with chaperone at bedside.  Patient had clots in vaginal canal but no active bleeding. No discharged.  Remains hemodynamically stable.  Will send repeat H&H.  Pelvic ultrasound pending.  ------------------------------------------------------------  Time: 05/28 0028  Comment: Pelvic US shows no fibroids, no ovarian torsion. Pt has dominant right follicle.   ------------------------------------------------------------  Time: 05/28 0038  Comment: Repeat hgb is stable at 9.1.   ------------------------------------------------------------  Time: 05/28 0103  Comment: Patient revaluated and remains hemodynamically stable.  She is feeling better.  Patient is concerned that she was having such severe right lower quadrant pain.  I discussed that this could be related to her menstrual cycle.  Pelvic ultrasound is reassuring.  Will obtain CT abdomen/pelvis.   ------------------------------------------------------------  Time: 05/28 0400  Comment: CT abd/pelvis independently reviewed and shows no acute process. Agree with radiology read. No appendicitis. Patient re-evaluated and symptoms improved. Discussed results of work up including incidental findings.     Discussed that pt could have pain related to menstrual cycle with dominant R follicle. I recommended close f/u with OB/GYN and PCP.     At this time patient is stable for discharge home with close PCP f/u. Discussed strict return  precautions and supportive care including signs of significant bleeding/anemia. Recommended f/u to recheck hgb as outpatient. Patient verbalized understanding and agreement of plan.                 MDM      Patient is a 38-year-old female with a history of asthma, hypertension who presents the ED for evaluation of right lower quadrant abdominal pain starting this evening just prior to arrival. Patient started her cycle on Friday.  Patient is afebrile, hemodynamically stable and satting well on room air.  Patient has right lower quadrant tenderness but no peritoneal signs.  Initial differential diagnosis includes ovarian torsion, fibroid, ruptured ovarian cyst, menstrual cycle pain.  Less likely UTI, appendicitis. Pt has no vaginal discharge or concern for STI.    Labs reviewed.  CBC shows no leukocytosis, hemoglobin 9.6 which is decreased from 13.6 two years ago.  Patient is hemodynamically stable with no other bleeding. Suspect anemia likely 2/2 heavy menstrual cycle. CMP unremarkable.  Lipase normal.  Pregnancy test negative.  Will obtain pelvic ultrasound, UA, and give toradol.        Disposition and Plan     Clinical Impression:  1. RLQ abdominal pain    2. Menorrhagia with irregular cycle    3. Dysmenorrhea         Disposition:  Discharge  5/28/2024  3:41 am    Follow-up:  Maurizio Madrid DO  [address] [place] [id number] [phone]    Schedule an appointment as soon as possible for a visit in 1 day(s)            Medications Prescribed:  Discharge Medication List as of 5/28/2024  3:52 AM          Anny Moon DO  Emergency Medicine Physician

## 2024-05-28 NOTE — ED INITIAL ASSESSMENT (HPI)
Patient reports she has been on her menstrual cycle for a couple days, a couple of hours ago her period became severe with cramping, bleeding, and clots.  Reports the bleeding has slowed down at this time.  Hx of ovarian cysts.   Denies chance of pregnancy

## 2024-05-28 NOTE — DISCHARGE INSTRUCTIONS
You were seen in the emergency department.    Your ultrasound showed a dominant follicle but otherwise was normal.  Your CT scan was also reassuring. You were found to be anemic menstrual cycle.  We recommend that you take ibuprofen and Tylenol for the next 2 days for your pain.  Apply heat to your painful area.  Return to the emergency department if you develop worsening bleeding, severe weakness, shortness of breath, nausea or vomiting, significant abdominal pain, or any other new concerns or worsening symptoms.  Please follow-up closely with your primary care physician and OB/GYN doctor.

## 2024-05-28 NOTE — ED QUICK NOTES
Pt returns from ultrasound c/o sever RLQ pain and pressure into her low back. Pt states she emptied her bladder which felt difficult and had a large amount of blood with clots come into the toilet from the vagina. Medication administered and Dr Moon aware.

## 2024-08-15 ENCOUNTER — APPOINTMENT (OUTPATIENT)
Dept: GENERAL RADIOLOGY | Age: 39
End: 2024-08-15
Attending: PHYSICIAN ASSISTANT
Payer: COMMERCIAL

## 2024-08-15 ENCOUNTER — HOSPITAL ENCOUNTER (OUTPATIENT)
Age: 39
Discharge: HOME | End: 2024-08-15
Payer: COMMERCIAL

## 2024-08-15 VITALS
HEIGHT: 67 IN | WEIGHT: 260 LBS | BODY MASS INDEX: 40.81 KG/M2 | TEMPERATURE: 98 F | OXYGEN SATURATION: 100 % | SYSTOLIC BLOOD PRESSURE: 155 MMHG | HEART RATE: 86 BPM | RESPIRATION RATE: 16 BRPM | DIASTOLIC BLOOD PRESSURE: 84 MMHG

## 2024-08-15 DIAGNOSIS — J45.901 EXACERBATION OF ASTHMA, UNSPECIFIED ASTHMA SEVERITY, UNSPECIFIED WHETHER PERSISTENT (HCC): Primary | ICD-10-CM

## 2024-08-15 LAB — SARS-COV-2 RNA RESP QL NAA+PROBE: NOT DETECTED

## 2024-08-15 PROCEDURE — 99214 OFFICE O/P EST MOD 30 MIN: CPT

## 2024-08-15 PROCEDURE — 71046 X-RAY EXAM CHEST 2 VIEWS: CPT | Performed by: PHYSICIAN ASSISTANT

## 2024-08-15 RX ORDER — BENZONATATE 100 MG/1
100 CAPSULE ORAL 3 TIMES DAILY PRN
Qty: 30 CAPSULE | Refills: 0 | Status: SHIPPED | OUTPATIENT
Start: 2024-08-15 | End: 2024-09-14

## 2024-08-15 RX ORDER — ALBUTEROL SULFATE 90 UG/1
2 AEROSOL, METERED RESPIRATORY (INHALATION) EVERY 4 HOURS PRN
Qty: 1 EACH | Refills: 0 | Status: SHIPPED | OUTPATIENT
Start: 2024-08-15 | End: 2024-09-14

## 2024-08-15 RX ORDER — PREDNISONE 20 MG/1
40 TABLET ORAL DAILY
Qty: 10 TABLET | Refills: 0 | Status: SHIPPED | OUTPATIENT
Start: 2024-08-15 | End: 2024-08-20

## 2024-08-15 RX ORDER — CODEINE PHOSPHATE AND GUAIFENESIN 10; 100 MG/5ML; MG/5ML
10 SOLUTION ORAL EVERY 6 HOURS PRN
Qty: 180 ML | Refills: 0 | Status: SHIPPED | OUTPATIENT
Start: 2024-08-15

## 2024-08-15 RX ORDER — OXYMETAZOLINE HYDROCHLORIDE 0.05 G/100ML
1 SPRAY NASAL 2 TIMES DAILY
Qty: 15 ML | Refills: 0 | Status: SHIPPED | OUTPATIENT
Start: 2024-08-15 | End: 2024-09-14

## 2024-08-15 RX ORDER — PSEUDOEPHEDRINE HCL 30 MG
30 TABLET ORAL 3 TIMES DAILY
Qty: 15 TABLET | Refills: 0 | Status: SHIPPED | OUTPATIENT
Start: 2024-08-15

## 2024-08-15 NOTE — ED PROVIDER NOTES
Patient Seen in: [facility]      History     Chief Complaint   Patient presents with    Cough/URI     Stated Complaint: Cough/URI    Subjective:   HPI    Ghada Ramos is a 38 year old female presents with acute onset of URI symptoms x 3 days. Patient reports  sinus congestion, non productive cough, rhinorrhea.  Patient denies dysphagia, throat pain, ear pain,  fevers, chills, shortness of breath, respiratory distress, stridor, neck pain/ stiffness, headache, eye pain/ redness, facial/ lip/ eyelid swelling. No medications taken prior to arrival. No alleviating/ aggravating factors. Patient is  concerned about COVID 19 infection at this encounter.  Patient is  immunized for COVID 19.              Objective:   No pertinent past medical history.            No pertinent past surgical history.              No pertinent social history.            Review of Systems   All other systems reviewed and are negative.      Positive for stated Chief Complaint: Cough/URI    Other systems are as noted in HPI.  Constitutional and vital signs reviewed.      All other systems reviewed and negative except as noted above.    Physical Exam     ED Triage Vitals [08/15/24 1139]   /84   Pulse 86   Resp 16   Temp 97.5 °F (36.4 °C)   Temp src Temporal   SpO2 100 %   O2 Device None (Room air)       Current Vitals:   Vital Signs  BP: 155/84 (did not take bp meds)  Pulse: 86  Resp: 16  Temp: 97.5 °F (36.4 °C)  Temp src: Temporal    Oxygen Therapy  SpO2: 100 %  O2 Device: None (Room air)            Physical Exam  Vitals and nursing note reviewed.   Constitutional:       General: She is not in acute distress.     Appearance: Normal appearance. She is normal weight. She is not ill-appearing, toxic-appearing or diaphoretic.   HENT:      Head: Normocephalic and atraumatic.      Right Ear: Tympanic membrane, ear canal and external ear normal.      Left Ear: Tympanic membrane, ear canal and external ear normal.      Nose:  Congestion present. No rhinorrhea.      Mouth/Throat:      Mouth: Mucous membranes are moist.      Pharynx: Oropharynx is clear. No oropharyngeal exudate or posterior oropharyngeal erythema.   Eyes:      Extraocular Movements: Extraocular movements intact.      Conjunctiva/sclera: Conjunctivae normal.      Pupils: Pupils are equal, round, and reactive to light.   Pulmonary:      Effort: Pulmonary effort is normal. No respiratory distress.      Breath sounds: No stridor. No wheezing, rhonchi or rales.   Chest:      Chest wall: No tenderness.   Musculoskeletal:         General: No swelling, tenderness, deformity or signs of injury. Normal range of motion.      Cervical back: Normal range of motion and neck supple.   Skin:     General: Skin is warm and dry.      Capillary Refill: Capillary refill takes less than 2 seconds.      Coloration: Skin is not jaundiced or pale.      Findings: No bruising, erythema, lesion or rash.   Neurological:      General: No focal deficit present.      Mental Status: She is alert and oriented to person, place, and time. Mental status is at baseline.   Psychiatric:         Mood and Affect: Mood normal.         Behavior: Behavior normal.               ED Course     Labs Reviewed   RAPID SARS-COV-2 BY PCR - Normal     XR CHEST PA + LAT CHEST (CPT=71046)   Final Result   PROCEDURE:  XR CHEST PA + LAT CHEST (CPT=71046)       INDICATIONS:  Cough/URI       COMPARISON:  None.       TECHNIQUE:  PA and lateral chest radiographs were obtained.       PATIENT STATED HISTORY: (As transcribed by Technologist)  Patient states    that she has a productive cough and chest congestion for two days. Patient    has a history of hypertension and asthma.            FINDINGS:  Cardiac silhouette and pulmonary vasculature within normal    limits. No focal consolidation, pneumothorax or pleural effusion.                         =====   CONCLUSION:  No focal consolidation.           LOCATION:  [facility]            Dictated by (CST): Genesis France MD on 8/15/2024 at 1:00 PM        Finalized by (CST): Genesis France MD on 8/15/2024 at 1:00 PM           Medications - No data to display  Results for orders placed or performed during the hospital encounter of 08/15/24   Rapid SARS-CoV-2 by PCR    Collection Time: 08/15/24 12:18 PM    Specimen: Nares; Other   Result Value Ref Range    Rapid SARS-CoV-2 by PCR Not Detected Not Detected     Vitals:    08/15/24 1139   BP: 155/84   Pulse: 86   Resp: 16   Temp: 97.5 °F (36.4 °C)                           MDM                                        Medical Decision Making  38-year-old well-appearing female presents with acute onset of URI symptoms that is contributing to an acute asthma exacerbation.  Considerations to include but not limited to bronchitis vs pneumonia vs COVID 19 vs influenza A vs influenza B.  Patient is overall well-appearing, normotensive, nontachycardic, not dyspneic with oxygen saturation at 100% on room air  Plan  - CXR pa/ lateral  - COVID 19  swab  - SpO2 100% on room air  - reassess  - DC to home   - rx: albuterol inhaler 1-2 puffs by mouth every 4-6 hours/ prn.  Prednisone 40mg po daily x 5 days.  .   Tessalon 100mg PO TID. Afrin 2 sprays to bilateral nostrils BID for no more than 48 consecutive hours to avoid rebound congestion. Sudafed 30mg po q 6 hours/ prn.guaifenesin- codeine poq 6 hours.  - OTC: Ibuprofen 600mg po q 8 hours/ prn. Tylenol 1g po q 6 hours/ prn.     - refer to PCP for further evaluation    - return to ED      Amount and/or Complexity of Data Reviewed  Labs: ordered. Decision-making details documented in ED Course.     Details: COVID-19 negative  Radiology: ordered and independent interpretation performed. Decision-making details documented in ED Course.     Details: No consolidation or cardiopulmonary abnormality based my independent interpretation        Disposition and Plan     Clinical Impression:  1. Exacerbation of asthma, unspecified asthma  severity, unspecified whether persistent (HCC)         Disposition:  Discharge  8/15/2024  1:16 pm    Follow-up:  JuliusMaurizio DO  [address]  [facility] [address] [phone]          [facility]  [address]  [facility] [address]  [phone]              Medications Prescribed:  Discharge Medication List as of 8/15/2024  1:19 PM        START taking these medications    Details   !! predniSONE 20 MG Oral Tab Take 2 tablets (40 mg total) by mouth daily for 5 days., Normal, Disp-10 tablet, R-0      benzonatate 100 MG Oral Cap Take 1 capsule (100 mg total) by mouth 3 (three) times daily as needed for cough., Normal, Disp-30 capsule, R-0      pseudoephedrine 30 MG Oral Tab Take 1 tablet (30 mg total) by mouth in the morning, at noon, and at bedtime., Normal, Disp-15 tablet, R-0      albuterol 108 (90 Base) MCG/ACT Inhalation Aero Soln Inhale 2 puffs into the lungs every 4 (four) hours as needed for Wheezing., Normal, Disp-1 each, R-0      oxymetazoline 0.05 % Nasal Solution 1 spray by Nasal route 2 (two) times daily., Normal, Disp-15 mL, R-0      !! predniSONE 20 MG Oral Tab Take 2 tablets (40 mg total) by mouth daily for 5 days., Normal, Disp-10 tablet, R-0      guaiFENesin-codeine 100-10 MG/5ML Oral Solution Take 10 mL by mouth every 6 (six) hours as needed., Normal, Disp-180 mL, R-0       !! - Potential duplicate medications found. Please discuss with provider.

## 2024-08-15 NOTE — ED INITIAL ASSESSMENT (HPI)
States she has had URI symptoms for a few days, nasal congestion, sinus pressure and ear pain, now states since today she has productive cough. Requesting steroidss and antibiotic

## 2024-08-24 ENCOUNTER — APPOINTMENT (OUTPATIENT)
Dept: GENERAL RADIOLOGY | Age: 39
End: 2024-08-24
Attending: EMERGENCY MEDICINE
Payer: COMMERCIAL

## 2024-08-24 ENCOUNTER — HOSPITAL ENCOUNTER (INPATIENT)
Facility: HOSPITAL | Age: 39
LOS: 2 days | Discharge: HOME | End: 2024-08-26
Attending: EMERGENCY MEDICINE | Admitting: INTERNAL MEDICINE
Payer: COMMERCIAL

## 2024-08-24 DIAGNOSIS — D64.9 ANEMIA: ICD-10-CM

## 2024-08-24 DIAGNOSIS — D64.9 SYMPTOMATIC ANEMIA: Primary | ICD-10-CM

## 2024-08-24 DIAGNOSIS — R06.09 DOE (DYSPNEA ON EXERTION): ICD-10-CM

## 2024-08-24 PROBLEM — K21.9 GASTROESOPHAGEAL REFLUX DISEASE: Status: ACTIVE | Noted: 2024-08-24

## 2024-08-24 PROBLEM — N92.0 MENORRHAGIA WITH REGULAR CYCLE: Status: ACTIVE | Noted: 2024-08-24

## 2024-08-24 LAB
ALBUMIN SERPL-MCNC: 3.3 G/DL (ref 3.4–5)
ALBUMIN/GLOB SERPL: 1 {RATIO} (ref 1–2)
ALP LIVER SERPL-CCNC: 66 U/L
ALT SERPL-CCNC: 25 U/L
ANION GAP SERPL CALC-SCNC: 3 MMOL/L (ref 0–18)
ANTIBODY SCREEN: NEGATIVE
APTT PPP: 25.4 SECONDS (ref 23–36)
AST SERPL-CCNC: 9 U/L (ref 15–37)
ATRIAL RATE: 92 BPM
BASOPHILS # BLD AUTO: 0.06 X10(3) UL (ref 0–0.2)
BASOPHILS NFR BLD AUTO: 0.4 %
BILIRUB SERPL-MCNC: 0.1 MG/DL (ref 0.1–2)
BUN BLD-MCNC: 15 MG/DL (ref 9–23)
CALCIUM BLD-MCNC: 8.7 MG/DL (ref 8.5–10.1)
CHLORIDE SERPL-SCNC: 107 MMOL/L (ref 98–112)
CO2 SERPL-SCNC: 31 MMOL/L (ref 21–32)
CREAT BLD-MCNC: 0.82 MG/DL
DEPRECATED HBV CORE AB SER IA-ACNC: 2 NG/ML
EGFRCR SERPLBLD CKD-EPI 2021: 95 ML/MIN/1.73M2 (ref 60–?)
EOSINOPHIL # BLD AUTO: 0.16 X10(3) UL (ref 0–0.7)
EOSINOPHIL NFR BLD AUTO: 1 %
ERYTHROCYTE [DISTWIDTH] IN BLOOD BY AUTOMATED COUNT: 16.3 %
GLOBULIN PLAS-MCNC: 3.2 G/DL (ref 2.8–4.4)
GLUCOSE BLD-MCNC: 111 MG/DL (ref 70–99)
GLUCOSE BLD-MCNC: 69 MG/DL (ref 70–99)
HCG SERPL QL: NEGATIVE
HCT VFR BLD AUTO: 25.8 %
HGB BLD-MCNC: 7.5 G/DL
IMM GRANULOCYTES # BLD AUTO: 0.15 X10(3) UL (ref 0–1)
IMM GRANULOCYTES NFR BLD: 0.9 %
INR BLD: 0.95 (ref 0.8–1.2)
IRON SATN MFR SERPL: 5 %
IRON SERPL-MCNC: 20 UG/DL
LYMPHOCYTES # BLD AUTO: 6.71 X10(3) UL (ref 1–4)
LYMPHOCYTES NFR BLD AUTO: 41.9 %
MCH RBC QN AUTO: 21.9 PG (ref 26–34)
MCHC RBC AUTO-ENTMCNC: 29.1 G/DL (ref 31–37)
MCV RBC AUTO: 75.2 FL
MONOCYTES # BLD AUTO: 1.29 X10(3) UL (ref 0.1–1)
MONOCYTES NFR BLD AUTO: 8.1 %
NEUTROPHILS # BLD AUTO: 7.63 X10 (3) UL (ref 1.5–7.7)
NEUTROPHILS # BLD AUTO: 7.63 X10(3) UL (ref 1.5–7.7)
NEUTROPHILS NFR BLD AUTO: 47.7 %
NT-PROBNP SERPL-MCNC: 141 PG/ML (ref ?–125)
OSMOLALITY SERPL CALC.SUM OF ELEC: 291 MOSM/KG (ref 275–295)
P AXIS: 9 DEGREES
P-R INTERVAL: 160 MS
PLATELET # BLD AUTO: 592 10(3)UL (ref 150–450)
PLATELET MORPHOLOGY: NORMAL
POTASSIUM SERPL-SCNC: 3.3 MMOL/L (ref 3.5–5.1)
PROT SERPL-MCNC: 6.5 G/DL (ref 6.4–8.2)
PROTHROMBIN TIME: 12.7 SECONDS (ref 11.6–14.8)
Q-T INTERVAL: 354 MS
QRS DURATION: 90 MS
QTC CALCULATION (BEZET): 437 MS
R AXIS: 23 DEGREES
RBC # BLD AUTO: 3.43 X10(6)UL
RH BLOOD TYPE: POSITIVE
RH BLOOD TYPE: POSITIVE
SODIUM SERPL-SCNC: 141 MMOL/L (ref 136–145)
T AXIS: 60 DEGREES
TOTAL IRON BINDING CAPACITY: 395 UG/DL (ref 250–425)
TRANSFERRIN SERPL-MCNC: 314 MG/DL (ref 250–380)
TROPONIN I SERPL HS-MCNC: 6 NG/L
VENTRICULAR RATE: 92 BPM
WBC # BLD AUTO: 16 X10(3) UL (ref 4–11)

## 2024-08-24 PROCEDURE — 99223 1ST HOSP IP/OBS HIGH 75: CPT | Performed by: INTERNAL MEDICINE

## 2024-08-24 PROCEDURE — 71045 X-RAY EXAM CHEST 1 VIEW: CPT | Performed by: EMERGENCY MEDICINE

## 2024-08-24 RX ORDER — MELATONIN
3 NIGHTLY PRN
Status: DISCONTINUED | OUTPATIENT
Start: 2024-08-24 | End: 2024-08-26

## 2024-08-24 RX ORDER — POTASSIUM CHLORIDE 1500 MG/1
40 TABLET, EXTENDED RELEASE ORAL EVERY 4 HOURS
Status: COMPLETED | OUTPATIENT
Start: 2024-08-24 | End: 2024-08-24

## 2024-08-24 RX ORDER — LOSARTAN POTASSIUM 100 MG/1
100 TABLET ORAL DAILY
Status: DISCONTINUED | OUTPATIENT
Start: 2024-08-24 | End: 2024-08-26

## 2024-08-24 RX ORDER — ALPRAZOLAM 0.5 MG
0.5 TABLET ORAL 3 TIMES DAILY PRN
COMMUNITY

## 2024-08-24 RX ORDER — ALPRAZOLAM 0.5 MG
0.5 TABLET ORAL 3 TIMES DAILY PRN
Status: DISCONTINUED | OUTPATIENT
Start: 2024-08-24 | End: 2024-08-26

## 2024-08-24 RX ORDER — ONDANSETRON 2 MG/ML
4 INJECTION INTRAMUSCULAR; INTRAVENOUS EVERY 6 HOURS PRN
Status: DISCONTINUED | OUTPATIENT
Start: 2024-08-24 | End: 2024-08-24

## 2024-08-24 RX ORDER — MONTELUKAST SODIUM 10 MG/1
10 TABLET ORAL NIGHTLY
Status: DISCONTINUED | OUTPATIENT
Start: 2024-08-24 | End: 2024-08-26

## 2024-08-24 RX ORDER — ACETAMINOPHEN 500 MG
500 TABLET ORAL EVERY 4 HOURS PRN
Status: DISCONTINUED | OUTPATIENT
Start: 2024-08-24 | End: 2024-08-26

## 2024-08-24 RX ORDER — ALBUTEROL SULFATE 90 UG/1
2 AEROSOL, METERED RESPIRATORY (INHALATION) EVERY 4 HOURS PRN
Status: DISCONTINUED | OUTPATIENT
Start: 2024-08-24 | End: 2024-08-26

## 2024-08-24 RX ORDER — ONDANSETRON 2 MG/ML
4 INJECTION INTRAMUSCULAR; INTRAVENOUS EVERY 6 HOURS PRN
Status: DISCONTINUED | OUTPATIENT
Start: 2024-08-24 | End: 2024-08-26

## 2024-08-24 RX ORDER — CYCLOBENZAPRINE HCL 10 MG
10 TABLET ORAL 3 TIMES DAILY PRN
Status: DISCONTINUED | OUTPATIENT
Start: 2024-08-24 | End: 2024-08-26

## 2024-08-24 RX ORDER — CYCLOBENZAPRINE HCL 10 MG
10 TABLET ORAL 3 TIMES DAILY PRN
COMMUNITY

## 2024-08-24 NOTE — H&P
[facility]                                                               [facility]         Ghada Ramos Patient Status:  Inpatient    1985 MRN [id number]   Location [facility]A Attending Marisela Reyes MD   Hosp Day # 0 PCP Maurizio Madrid DO     Chief Complaint: Fatigue, shortness of breath, abnormal lab with low hemoglobin    History of Present Illness:  Ghada Ramos is a 38 year old female admitted with fatigue, shortness of breath.  Patient states she has had heavy menstrual bleed on and off for the last 6 months.  Patient states she had had similar episode 3 years back after COVID vaccination which resolved at that time.  Patient states she also had history of GI bleed 10 years back for which she had colonoscopy at that time which showed hemorrhoids.  Patient states she has history of constipation and hemorrhoids.  Patient states she had recent upper respiratory infection for which she has been taking Augmentin and prednisone along with albuterol inhalers as needed.  Has taken naproxen as needed previously.  Complains of heartburns.  Takes omeprazole at home on and off for the heartburns according to patient.  Patient states last menstrual period was last month when  she had 2 weeks of menstrual bleeding at that time.  Patient states current episode only had a few spots and did not have any heavy..  Patient states she did notice that she was passing clots when she was having bowel movements also.  Complains of fatigue.  Complains of shortness of breath more on exertion.  Chest pain and constant chest discomfort .  Denies any fever or chills.  Patient states she has had pelvic ultrasound in the past for heavy periods which did not show any abnormality at that time, only showed a dominant ovarian follicle, epic chart reviewed which showed last pelvic ultrasound done on 2024  Showing the same.  Patient states she has history of hypertension and ran out of her blood  pressure pills and has not taken that for some time.    History:  Past Medical History:    Anemia    Asthma (HCC)    Back pain    Constipation    Essential hypertension    Gastrointestinal bleed    10yrs back  per pt and colonoscopy at that time showed hemorhoids per pt       Past Surgical History:   Procedure Laterality Date          Colonoscopy      Foot surgery Bilateral 2021       Family history:  Family History   Problem Relation Age of Onset    Hypertension Father     Hypertension Maternal Grandmother     Hypertension Paternal Grandmother     Diabetes Paternal Grandfather     Hypertension Paternal Grandfather     Asthma Brother     Cancer Maternal Aunt         Thyroid. Mothers half sibling      Reviewed    Social history:   reports that she has never smoked. She has never used smokeless tobacco. She reports current alcohol use. She reports that she does not use drugs.    Allergies:  Allergies   Allergen Reactions    Ceclor [Cefaclor] SWELLING       Home Medications:  Medications Prior to Admission   Medication Sig Dispense Refill Last Dose    [] predniSONE 20 MG Oral Tab Take 2 tablets (40 mg total) by mouth daily for 5 days. 10 tablet 0     benzonatate 100 MG Oral Cap Take 1 capsule (100 mg total) by mouth 3 (three) times daily as needed for cough. 30 capsule 0     pseudoephedrine 30 MG Oral Tab Take 1 tablet (30 mg total) by mouth in the morning, at noon, and at bedtime. 15 tablet 0     albuterol 108 (90 Base) MCG/ACT Inhalation Aero Soln Inhale 2 puffs into the lungs every 4 (four) hours as needed for Wheezing. 1 each 0     oxymetazoline 0.05 % Nasal Solution 1 spray by Nasal route 2 (two) times daily. 15 mL 0     [] predniSONE 20 MG Oral Tab Take 2 tablets (40 mg total) by mouth daily for 5 days. 10 tablet 0     guaiFENesin-codeine 100-10 MG/5ML Oral Solution Take 10 mL by mouth every 6 (six) hours as needed. 180 mL 0     polyethylene glycol, PEG 3350, 17 g Oral Powd Pack Take  17 g by mouth daily as needed (constipation). 30 each 0     naproxen (ALEVE) 220 MG Oral Tab Take 1-2 tablets (220-440 mg total) by mouth daily.       hydroCHLOROthiazide 25 MG Oral Tab Take 1 tablet (25 mg total) by mouth daily.       Omeprazole 40 MG Oral Capsule Delayed Release Take 1 capsule (40 mg total) by mouth daily.       Montelukast Sodium 10 MG Oral Tab Take 1 tablet (10 mg total) by mouth nightly.       losartan 100 MG Oral Tab Take 1 tablet (100 mg total) by mouth daily.       Potassium Chloride ER 10 MEQ Oral Tab CR Take 1 tablet (10 mEq total) by mouth daily.          Review of Systems:  A comprehensive 14 point review of systems was completed.  Pertinent positives and negatives noted in the the HPI.    Physical Exam:     Vital signs: Blood pressure 159/74, pulse 90, temperature 97.2 °F (36.2 °C), temperature source Temporal, resp. rate 22, height 5' 7\" (1.702 m), weight 252 lb (114.3 kg), last menstrual period 08/01/2024, SpO2 100%, not currently breastfeeding.  General: No acute distress.   HEENT: Moist mucous membranes.  Pallor present  Respiratory: Clear to auscultation bilaterally.  No wheezes. No rhonchi.  Cardiovascular: S1, S2.  Regular rate and rhythm.   Abdomen: Soft, nontender, nondistended.  Positive bowel sounds.  Neurologic: Awake alert, no focal neurological deficits.  Musculoskeletal: Full range of motion of all extremities.  Has bilateral trace pedal edema  Integument: Pallor present  Psychiatric: Appropriate mood and affect.      Diagnostic Data:      Laboratory Data:   Lab Results   Component Value Date    WBC 16.0 08/24/2024    HGB 7.5 08/24/2024    HCT 25.8 08/24/2024    .0 08/24/2024    CREATSERUM 0.82 08/24/2024    BUN 15 08/24/2024     08/24/2024    K 3.3 08/24/2024     08/24/2024    CO2 31.0 08/24/2024    GLU 69 08/24/2024    CA 8.7 08/24/2024    ALB 3.3 08/24/2024    ALKPHO 66 08/24/2024    BILT 0.1 08/24/2024    TP 6.5 08/24/2024    AST 9 08/24/2024    ALT  25 08/24/2024    PTT 25.4 08/24/2024    INR 0.95 08/24/2024    PTP 12.7 08/24/2024    PGLU 111 08/24/2024       Recent Labs   Lab 08/24/24  1340   PTP 12.7   INR 0.95       Imaging:  Imaging data reviewed in Epic.  Chest x-ray done on 8/24/2024 with no acute disease    ASSESSMENT / PLAN:     #Symptomatic anemia with exertional shortness of breath and fatigue and dizziness   #GERD, passing clots during bowel movement according to patient, possible GI bleed, history of GI bleed  # menorrhagia  Follow hemoglobin to 8 hours  Will transfuse if hemoglobin less than 7  Check iron studies hold   home prednisone  We will give IV PPI twice daily  No nonsteroidal anti-inflammatories, this was discussed with patient  Will get GI and OB/GYN on consult with patient's history of GI bleed and also with history of recent menorrhagia   #recent URI   #mild intermittent asthma   continue home albuterol inhalers as needed  Continue home Singulair  Hold off on any oral steroids- recently took a course of prednisone and Augmentin for URI according to patient    #History of constipation and hemorrhoids    #Hypertension   Continue home losartan   hold home hydrochlorothiazide at this time  Follow blood pressure    #history of back pain    #Morbid obesity with a BMI of 41.08    # anxiety  Continue home Xanax as needed      Quality:  DVT Prophylaxis: DVT Mechanical Prophylaxis:   SCDs,    DVT Pharmacologic Prophylaxis   Medication   None          No pharmacological prophylaxis due to GI bleed and menorrhagia and symptomatic anemia    CODE status:   Code Status: Not on file  Pollard: No urinary catheter in place      Plan of care discussed with  patient, patient's friend at bedside per patient's wishes  Discussed with RN    Discussed with ER Physician.  My colleague will follow from morning tomorrow      Marisela Reyes MD  8/24/2024  4:43 PM

## 2024-08-24 NOTE — ED PROVIDER NOTES
Patient Seen in: [facility] 4nw-a      History     Chief Complaint   Patient presents with    Abnormal Result     Stated Complaint: wed had blood work, hgb 8.5 - having SOB    Subjective:   HPI    This is a 38-year-old female presents to the Emergency Department for evaluation of anemia.  Patient reports she has a history of anemia which has been attributed to heavy menses, she had blood work on Wednesday as she has been having some shortness of breath when she exerts herself, hemoglobin was 8.5.  Patient does admit that she will feel some heaviness in her chest when she exerts himself as well.  Currently patient denies either symptom of shortness of breath or chest discomfort.  Denies feel lightheaded or dizzy.  Denies nausea or vomiting.  Patient denies melena or hematochezia.  Denies urinary symptoms.  Patient reports she is currently being treated for respiratory infection, is on day #8 of prednisone and day #4 of Augmentin.    Objective:   Past Medical History:    Anemia    Asthma (HCC)    Back pain    Constipation    Essential hypertension    Gastrointestinal bleed    10yrs back  per pt and colonoscopy at that time showed hemorhoids per pt              Past Surgical History:   Procedure Laterality Date          Colonoscopy      Foot surgery Bilateral 2021                Social History     Socioeconomic History    Marital status:    Tobacco Use    Smoking status: Never    Smokeless tobacco: Never   Vaping Use    Vaping status: Never Used   Substance and Sexual Activity    Alcohol use: Yes     Comment: Socially    Drug use: No    Sexual activity: Not Currently     Social Determinants of Health     Food Insecurity: No Food Insecurity (2024)    Food Insecurity     Food Insecurity: Never true   Transportation Needs: No Transportation Needs (2024)    Transportation Needs     Lack of Transportation: No   Housing Stability: Low Risk  (2024)    Housing Stability     Housing  Instability: No              Review of Systems    Positive for stated Chief Complaint: Abnormal Result    Other systems are as noted in HPI.  Constitutional and vital signs reviewed.      All other systems reviewed and negativExcept as noted above.    Physical Exam     ED Triage Vitals [08/24/24 1153]   BP (!) 166/93   Pulse 94   Resp 18   Temp 97.2 °F (36.2 °C)   Temp src Temporal   SpO2 100 %   O2 Device None (Room air)       Current Vitals:   Vital Signs  BP: 146/86  Pulse: 84  Resp: 18  Temp: 98.5 °F (36.9 °C)  Temp src: Oral  MAP (mmHg): (!) 104 (RN notified)    Oxygen Therapy  SpO2: 100 %  O2 Device: None (Room air)  Pulse Oximetry Type: Continuous            Physical Exam    GENERAL: Patient is awake, alert  HEENT:  no scleral icterus.  Mucous membranes are moist  HEART: Regular rate and rhythm, no murmurs.  LUNGS: Clear to auscultation bilaterally.  No Rales, no rhonchi, no wheezing, no stridor.  ABDOMEN: Soft, nondistended,non tender  Rectal exam: Performed with PCT Carri as chaperone-guaiac negative brown stool  EXTREMITIES: No peripheral edema, no calf tenderness        ED Course     Labs Reviewed   CBC WITH DIFFERENTIAL WITH PLATELET - Abnormal; Notable for the following components:       Result Value    WBC 16.0 (*)     RBC 3.43 (*)     HGB 7.5 (*)     HCT 25.8 (*)     .0 (*)     MCV 75.2 (*)     MCH 21.9 (*)     MCHC 29.1 (*)     Lymphocyte Absolute 6.71 (*)     Monocyte Absolute 1.29 (*)     All other components within normal limits   COMP METABOLIC PANEL (14) - Abnormal; Notable for the following components:    Glucose 69 (*)     Potassium 3.3 (*)     AST 9 (*)     Albumin 3.3 (*)     All other components within normal limits   PRO BETA NATRIURETIC PEPTIDE - Abnormal; Notable for the following components:    Pro-Beta Natriuretic Peptide 141 (*)     All other components within normal limits   RBC MORPHOLOGY SCAN - Abnormal; Notable for the following components:    RBC Morphology See morphology  below (*)     All other components within normal limits   POCT GLUCOSE - Abnormal; Notable for the following components:    POC Glucose 111 (*)     All other components within normal limits   HCG, BETA SUBUNIT, QUAL - Normal   TROPONIN I HIGH SENSITIVITY - Normal   PROTHROMBIN TIME (PT) - Normal   PTT, ACTIVATED - Normal   SCAN SLIDE   PATH COMMENT CBC   IRON AND TIBC   FERRITIN   TYPE AND SCREEN    Narrative:     The following orders were created for panel order Type and screen.  Procedure                               Abnormality         Status                     ---------                               -----------         ------                     ABORH (Blood Type)[[id number]]                               Final result               Antibody Screen[[id number]]                                  Final result                 Please view results for these tests on the individual orders.   ABORH (BLOOD TYPE)   ANTIBODY SCREEN   ABORH CONFIRMATION   RAINBOW DRAW BLUE     EKG    Rate, intervals and axes as noted on EKG Report.  Rate: 92  Rhythm: Sinus Rhythm  Reading: Normal sinus rhythm.  No ST elevation.                          MDM        Differential diagnosis before testing includes but not limited to symptomatic anemia, pneumonia, GI bleed, electrolyte abnormality, which is a medical condition that poses a threat to life/function    Radiographic images  I personally reviewed the radiographs and my individual interpretation shows chest x-ray no pneumothorax  I also reviewed the official reports that showed chest x-ray no acute disease per radiologist    Discussion of management (consult/physicians, social work, pharmacy,ect) [facility] physician Dr. Reyes      Course of Events during Emergency Room Visit include, placed on cardiac monitor and pulse ox.  Chemistry sodium 141 potassium 3.3 BUN 15 creatinine 0.82 glucose 69.  Negative pregnancy test.  CBC white count 16.0 hemoglobin 7.5 platelet 592.  Patient  presents with dyspnea on exertion with chest discomfort consistent with symptomatic anemia, patient reports a history of heavy menses.  Patient does have elevated white blood cell count which is likely due to recent steroid use, there is no evidence of acute infectious process on exam at this time.  Discussed with hospitalist physician will transfer [facility] for further evaluation and admission.    Shared decision making was utilized           Disposition:    Admission  I have discussed with the patient the results of test, differential diagnosis, and treatment plan. They expressed clear understanding of these instructions and agrees to the plan provided.       Note to patient: The 21st Century Cures Act makes medical notes like these available to patients in the interest of transparency. However, this is a medical document intended as peer to peer communication. It is written in medical language and may contain abbreviations or verbiage that are unfamiliar. It may appear blunt or direct. Medical documents are intended to carry relevant information, facts as evident, and the clinical opinion of the practitioner.           Admission disposition: 8/24/2024  2:54 PM                                        Medical Decision Making      Disposition and Plan     Clinical Impression:  1. Symptomatic anemia    2. GARCIA (dyspnea on exertion)         Disposition:  Admit  8/24/2024  2:54 pm    Follow-up:  No follow-up provider specified.        Medications Prescribed:  Current Discharge Medication List                            Hospital Problems       Present on Admission  Date Reviewed: 6/11/2021            ICD-10-CM Noted POA    * (Principal) Symptomatic anemia D64.9 8/24/2024 Yes    Benign essential HTN I10 Unknown Yes    GARCIA (dyspnea on exertion) R06.09 8/24/2024 Yes    Gastroesophageal reflux disease K21.9 8/24/2024 Unknown    Menorrhagia with regular cycle N92.0 8/24/2024 Unknown    Morbid obesity with BMI of  40.0-44.9, adult (HCC) E66.01, Z68.41 Unknown Yes

## 2024-08-24 NOTE — ED QUICK NOTES
Orders for admission, patient is aware of plan and ready to go upstairs. Any questions, please call ED ROSALIE Salas at extension [phone].     Patient Covid vaccination status: Fully vaccinated     COVID Test Ordered in ED: None    COVID Suspicion at Admission: N/A    Running Infusions:  None    Mental Status/LOC at time of transport: A&Ox4    Other pertinent information: N/A  CIWA score: N/A   NIH score:  N/A

## 2024-08-24 NOTE — PLAN OF CARE
NURSING ADMISSION NOTE      Patient admitted via Ambulance  Oriented to room.  Safety precautions initiated.  Bed in low position.  Call light in reach.    Patient is alert, denies pain. GI consult notified, Dr. Flores states he will see patient tomorrow. Family member at bedside, call light within reach.     Gynecology consult paged to Dr. Powell. Dr. Powell called RN back stating they are not on call for inpatient consults, directed to page Dr. EVELYN Resendiz - page sent. Dr. Resendiz called RN, instructed to page Dr. Chan. Page sent to Dr. Chan - orders received.

## 2024-08-25 DIAGNOSIS — D64.9 ANEMIA: Primary | ICD-10-CM

## 2024-08-25 LAB
ANION GAP SERPL CALC-SCNC: 1 MMOL/L (ref 0–18)
BASOPHILS # BLD AUTO: 0.03 X10(3) UL (ref 0–0.2)
BASOPHILS NFR BLD AUTO: 0.3 %
BUN BLD-MCNC: 14 MG/DL (ref 9–23)
CALCIUM BLD-MCNC: 8.6 MG/DL (ref 8.7–10.4)
CHLORIDE SERPL-SCNC: 106 MMOL/L (ref 98–112)
CO2 SERPL-SCNC: 32 MMOL/L (ref 21–32)
CREAT BLD-MCNC: 0.57 MG/DL
EGFRCR SERPLBLD CKD-EPI 2021: 121 ML/MIN/1.73M2 (ref 60–?)
EOSINOPHIL # BLD AUTO: 0.19 X10(3) UL (ref 0–0.7)
EOSINOPHIL NFR BLD AUTO: 1.6 %
ERYTHROCYTE [DISTWIDTH] IN BLOOD BY AUTOMATED COUNT: 16.6 %
GLUCOSE BLD-MCNC: 70 MG/DL (ref 70–99)
HCT VFR BLD AUTO: 25.3 %
HCT VFR BLD AUTO: 30.5 %
HGB BLD-MCNC: 7.1 G/DL
HGB BLD-MCNC: 7.2 G/DL
HGB BLD-MCNC: 8.3 G/DL
HGB BLD-MCNC: 9.3 G/DL
IMM GRANULOCYTES # BLD AUTO: 0.08 X10(3) UL (ref 0–1)
IMM GRANULOCYTES NFR BLD: 0.7 %
LYMPHOCYTES # BLD AUTO: 4.68 X10(3) UL (ref 1–4)
LYMPHOCYTES NFR BLD AUTO: 39.4 %
MCH RBC QN AUTO: 21.5 PG (ref 26–34)
MCHC RBC AUTO-ENTMCNC: 28.1 G/DL (ref 31–37)
MCV RBC AUTO: 76.4 FL
MONOCYTES # BLD AUTO: 0.6 X10(3) UL (ref 0.1–1)
MONOCYTES NFR BLD AUTO: 5.1 %
NEUTROPHILS # BLD AUTO: 6.3 X10 (3) UL (ref 1.5–7.7)
NEUTROPHILS # BLD AUTO: 6.3 X10(3) UL (ref 1.5–7.7)
NEUTROPHILS NFR BLD AUTO: 52.9 %
OSMOLALITY SERPL CALC.SUM OF ELEC: 287 MOSM/KG (ref 275–295)
PLATELET # BLD AUTO: 531 10(3)UL (ref 150–450)
POTASSIUM SERPL-SCNC: 4.1 MMOL/L (ref 3.5–5.1)
POTASSIUM SERPL-SCNC: 4.5 MMOL/L (ref 3.5–5.1)
RBC # BLD AUTO: 3.31 X10(6)UL
SODIUM SERPL-SCNC: 139 MMOL/L (ref 136–145)
WBC # BLD AUTO: 11.9 X10(3) UL (ref 4–11)

## 2024-08-25 PROCEDURE — 30233N1 TRANSFUSION OF NONAUTOLOGOUS RED BLOOD CELLS INTO PERIPHERAL VEIN, PERCUTANEOUS APPROACH: ICD-10-PCS | Performed by: OBSTETRICS & GYNECOLOGY

## 2024-08-25 PROCEDURE — 99232 SBSQ HOSP IP/OBS MODERATE 35: CPT | Performed by: INTERNAL MEDICINE

## 2024-08-25 RX ORDER — SODIUM CHLORIDE 9 MG/ML
INJECTION, SOLUTION INTRAVENOUS ONCE
Status: COMPLETED | OUTPATIENT
Start: 2024-08-25 | End: 2024-08-25

## 2024-08-25 NOTE — CONSULTS
[facility]    PATIENT'S NAME: LEILANI HANSEN   ATTENDING PHYSICIAN: Marisela Reyes M.D.   CONSULTING PHYSICIAN: Mainor Resendiz D.O.   PATIENT ACCOUNT#:   [id number]    LOCATION:  [address] [place] [address]  MEDICAL RECORD #:   [id number]       YOB: 1985  ADMISSION DATE:       2024      CONSULT DATE:  2024    REPORT OF CONSULTATION    HISTORY OF PRESENT ILLNESS:  The patient is a 38-year-old female.  She is  1, para 1.  She presented to the emergency room yesterday with severe fatigue with dizziness, did not feel well at all, was found to be anemic.  She was found to have a low hemoglobin, 7.1, and hematocrit 25.  States that she has been battling heavy periods.  She was in the ER 1 month ago and had very heavy periods.  An ultrasound was done, and they let her go home.  Patient was being worked up about 1 year ago for endometrial ablation but did not follow through, but patient is ready for any type of definitive treatment.  She does not feel well, is tired of bleeding heavy.      PAST MEDICAL HISTORY:  She has hypertension, asthma, severe anemia, history of PCOS and fibroids.    PAST SURGICAL HISTORY:   in 2014, had a girl; 2 previous foot surgeries.    MEDICATIONS:  She is on losartan for her blood pressure, Singulair for asthma, and intermittent hydrochlorothiazide.    ALLERGIES:  She has allergy to Ceclor as a child, she said she got swelling, but since then she has taken Augmentin without any problems.      SOCIAL HISTORY:  No tobacco or drugs.  She does drink alcohol.    FAMILY HISTORY:  Her father is alive, 67, with hypertension.  Mother alive, 64, no problems.     GYNECOLOGICAL HISTORY:  Menarche at age 14.  Menstruates every 28 days, approximately 3 days.  She had very normal light periods until her COVID shots and after that her periods were heavy with large clots, severe cramping.  Denies any sexually transmitted diseases.  Normal Pap smears.      OBSTETRICAL  HISTORY:  In , had a , girl, 6 pounds 14 ounces, with preeclampsia.     PHYSICAL EXAMINATION:  Vitals are stable currently, but she states when she gets up she is very dizzy and lightheaded and has headaches.      LABORATORY DATA:  Her white count was 11.9 today, hemoglobin 7.1, hematocrit 25.3, her platelets were 531,000.  Her hemoglobin on the  was 7.2.      Her ultrasound on  showed that uterus was 8.77 x 4.98 x 4.5.  Ovaries were normal.  This was in May.  Lining was normal.    CT at that time was normal.    ASSESSMENT:  Patient with symptomatic anemia, previous history of bleeding heavy, was previously in the ER for heavy bleeding.  Hemoglobin is 7.1, was 7.2 yesterday, but does not feel well.    PLAN:  Will transfuse 2 units of blood, repeat her CBC after the second unit.  Discussed options.  I said to her that I think she needs some treatment done.  This is her second time at the hospital, once in the ER and a second time being admitted for severe anemia.  She either needs an ablation or hysterectomy if she is done childbearing.  The risks of these things were discussed with her.    Dictated By Mainor Resendiz D.O.  d: 2024 12:23:55  t: 2024 13:51:42  [facility] [id number]  /

## 2024-08-25 NOTE — PLAN OF CARE
Pt received 1 unit of PRBC, tolerated it well. Dr. Resendiz notified of repeat hgb of 8.5. Transfusion of 2nd unit initiated as per his order. Pt also prepping for colonoscopy in the AM. Half way through golitely, no bowel movements yet. VS stable, she states feeling less fatigued.

## 2024-08-25 NOTE — PROGRESS NOTES
Alert and orientated x4.  Ambulating in room.  Voiding without difficulty.  Occasional vaginal spotting.  Hgb 7.2 at midnite.  Dose of iron ordered.  Denies pain or nausea.  Tolerated first dose of premarin IV.  Afebrile and vital signs stable.

## 2024-08-25 NOTE — PROGRESS NOTES
[facility]   part of [facility]     Hospitalist Progress Note     Ghada Ramos Patient Status:  Inpatient    1985 MRN [id number]   [facility] 4NW-A Attending Marisela Reyes MD   Hosp Day # 1 PCP Maurizio Madrid DO     Chief Complaint: SOB, fatieu    Subjective:     Patient without acute events overnight. Still with some vaginal spotting . No abd pain. No N/V. No F/C.     Objective:    Review of Systems:   A comprehensive review of systems was completed; pertinent positive and negatives stated in subjective.    Vital signs:  Temp:  [97.2 °F (36.2 °C)-98.5 °F (36.9 °C)] 97.7 °F (36.5 °C)  Pulse:  [] 75  Resp:  [17-22] 19  BP: (145-169)/(63-93) 145/76  SpO2:  [100 %] 100 %    Physical Exam:    General: No acute distress  Respiratory: No wheezes, no rhonchi  Cardiovascular: S1, S2, regular rate and rhythm  Abdomen: Soft, Non-tender, non-distended, positive bowel sounds  Neuro: No new focal deficits.   Extremities: No edema      Diagnostic Data:    Labs:  Recent Labs   Lab 24  0722   WBC 16.0*  --  11.9*   HGB 7.5* 7.2* 7.1*   MCV 75.2*  --  76.4*   .0*  --  531.0*   INR 0.95  --   --        Recent Labs   Lab 24  23524  0722   GLU 69*  --  70   BUN 15  --  14   CREATSERUM 0.82  --  0.57   CA 8.7  --  8.6*   ALB 3.3*  --   --      --  139   K 3.3* 4.5 4.1     --  106   CO2 31.0  --  32.0   ALKPHO 66  --   --    AST 9*  --   --    ALT 25  --   --    BILT 0.1  --   --    TP 6.5  --   --        Estimated Creatinine Clearance: 132.7 mL/min (based on SCr of 0.57 mg/dL).    Recent Labs   Lab 08/24/24  1340   TROPHS 6       Recent Labs   Lab 24  1340   PTP 12.7   INR 0.95                  Microbiology    No results found for this visit on 24.      Imaging: Reviewed in Epic.    Medications:    pantoprazole  40 mg Intravenous Q12H    losartan  100 mg Oral Daily    montelukast  10 mg Oral Nightly     estrogens conjugated  25 mg Intravenous Q8H       Assessment & Plan:      #Symptomatic anemia   #GERD, passing clots during bowel movement according to patient, possible GI bleed, history of GI bleed  # menorrhagia  Trend hgb  Transfuse for hgb below 7  IV PPI BID  GI to eval  ObGYn to eval  Continue premarin  Awaiting decision on endoscopy    #Recent UTI  #mild Asthma  Resume inhalers    #Ess HTN  Continue home meds    #Morbid Obesity  BMI 41    #Anxiety  Continue home meds      Zeke Egan MD    Supplementary Documentation:     Quality:  DVT Mechanical Prophylaxis:   SCDs,    DVT Pharmacologic Prophylaxis   Medication   None                Code Status: Not on file  Pollard: No urinary catheter in place  Pollard Duration (in days):   Central line:    OMAIRA:     Discharge is dependent on: progress  At this point Ms. Ramos is expected to be discharge to: home    The 21st Century Cures Act makes medical notes like these available to patients in the interest of transparency. Please be advised this is a medical document. Medical documents are intended to carry relevant information, facts as evident, and the clinical opinion of the practitioner. The medical note is intended as peer to peer communication and may appear blunt or direct. It is written in medical language and may contain abbreviations or verbiage that are unfamiliar.

## 2024-08-25 NOTE — CONSULTS
[facility]                       Gastroenterology Consultation-[facility]    Ghada Ramos Patient Status:  Inpatient    1985 MRN [id number]   Location [facility] 4NW-A Attending Marisela Reyes MD   Hosp Day # 1 PCP Maurizio Madrid DO         Reason for consultation: anemia, hematochezia, GERD    HPI:   38-year-old female with a past medical history of hemorrhoids, constipation, GERD, asthma presenting with fatigue, dyspnea and anemia.  Patient does note heavy menstrual bleeding in the past.  Patient has had heartburn for many years and takes omeprazole intermittently for this.  No prior endoscopy.  Patient with a colonoscopy 10 years ago with a history of GI bleed and was found to have hemorrhoids per patient report.  Patient does endorse passage of clots recently with bowel movements and has this intermittently.  Hemoglobin 9.1 2024 and 7.5 on admission.  Ferritin 2.0.  CT abdomen pelvis 2024 with no acute process identified.    PMHx:   Past Medical History:    Anemia    Asthma (HCC)    Back pain    Constipation    Essential hypertension    Gastrointestinal bleed    10yrs back  per pt and colonoscopy at that time showed hemorhoids per pt       PSHx:   Past Surgical History:   Procedure Laterality Date          Colonoscopy      Foot surgery Bilateral 2021       Medications:    acetaminophen (Tylenol Extra Strength) tab 500 mg  500 mg Oral Q4H PRN    ondansetron (Zofran) 4 MG/2ML injection 4 mg  4 mg Intravenous Q6H PRN    melatonin tab 3 mg  3 mg Oral Nightly PRN    pantoprazole (Protonix) 40 mg in sodium chloride 0.9% PF 10 mL IV push  40 mg Intravenous Q12H    [COMPLETED] potassium chloride (Klor-Con M20) tab 40 mEq  40 mEq Oral Q4H    albuterol (Ventolin HFA) 108 (90 Base) MCG/ACT inhaler 2 puff  2 puff Inhalation Q4H PRN    ALPRAZolam (Xanax) tab 0.5 mg  0.5 mg Oral TID PRN    cyclobenzaprine (Flexeril) tab 10 mg  10 mg Oral TID PRN     losartan (Cozaar) tab 100 mg  100 mg Oral Daily    montelukast (Singulair) tab 10 mg  10 mg Oral Nightly    estrogens conjugated (Premarin) injection 25 mg  25 mg Intravenous Q8H       Allergies:   Allergies   Allergen Reactions    Ceclor [Cefaclor] SWELLING       SocHx:    Social History     Socioeconomic History    Marital status:    Tobacco Use    Smoking status: Never    Smokeless tobacco: Never   Vaping Use    Vaping status: Never Used   Substance and Sexual Activity    Alcohol use: Yes     Comment: Socially    Drug use: No    Sexual activity: Not Currently     Social Determinants of Health     Food Insecurity: No Food Insecurity (8/24/2024)    Food Insecurity     Food Insecurity: Never true   Transportation Needs: No Transportation Needs (8/24/2024)    Transportation Needs     Lack of Transportation: No   Housing Stability: Low Risk  (8/24/2024)    Housing Stability     Housing Instability: No         FamHx:    Family History   Problem Relation Age of Onset    Hypertension Father     Hypertension Maternal Grandmother     Hypertension Paternal Grandmother     Diabetes Paternal Grandfather     Hypertension Paternal Grandfather     Asthma Brother     Cancer Maternal Aunt         Thyroid. Mothers half sibling         ROS:  In addition to the pertinent positives described above:            Infectious Disease:  No chronic infections or recent fevers prior to the acute illness            Cardiovascular: No history of CAD, prior MI, chest pain, or palpitations            Respiratory: No shortness of breath, asthma, copd, recurrent pneumonia            Hematologic: The patient reports no easy bruising, frequent gum bleeding or nose bleeding;  The patient has no history of known chronic anemia            Dermatologic: The patient reports no recent rashes or chronic skin disorders            Rheumatologic: The patient reports no history of chronic arthritis, myalgias, arthralgias            Genitourinary:  The  patient reports no history of recurrent urinary tract infections, hematuria, dysuria, or nephrolithiasis           Psychiatric: The patient reports no history of depression, anxiety, suicidal ideation, or homicidal ideation           Oncologic: The patient reports on history of prior solid tumor or hematologic malignancy           ENT: The patient reports no hoarseness of voice, hearing loss, sinus congestion, tinnitus           Neurologic: The patient reports no history of seizure, stroke, or frequent headaches      PE: /83 (BP Location: Right arm)   Pulse 71   Temp 98.1 °F (36.7 °C) (Oral)   Resp 20   Ht 5' 7\" (1.702 m)   Wt 262 lb 4.8 oz (119 kg)   LMP 08/01/2024 (Approximate)   SpO2 100%   BMI 41.08 kg/m²     Gen: AAO x 3, NAD   CV: Regular rate and rhythm  Resp: Clear to auscultation bilaterally  Abdomen: Soft, non-tender, non-distended with the presence of bowel sounds; No hepatosplenomegaly; no rebound or guarding; No ascites is clinically apparent; no tympany to percussion  Ext: No peripheral edema or cyanosis  Skin: Warm and dry  Psychiatric: Appropriate mood and congruent affect without obvious depression or anxiety    Labs:   Lab Results   Component Value Date    WBC 16.0 08/24/2024    HGB 7.2 08/24/2024    HCT 25.8 08/24/2024    .0 08/24/2024    CREATSERUM 0.82 08/24/2024    BUN 15 08/24/2024     08/24/2024    K 4.5 08/24/2024     08/24/2024    CO2 31.0 08/24/2024    GLU 69 08/24/2024    CA 8.7 08/24/2024    ALB 3.3 08/24/2024    ALKPHO 66 08/24/2024    BILT 0.1 08/24/2024    AST 9 08/24/2024    ALT 25 08/24/2024    PTT 25.4 08/24/2024    INR 0.95 08/24/2024    PTP 12.7 08/24/2024     Recent Labs   Lab 08/24/24  1340 08/24/24  2350   GLU 69*  --    BUN 15  --    CREATSERUM 0.82  --    CA 8.7  --      --    K 3.3* 4.5     --    CO2 31.0  --      Recent Labs   Lab 08/24/24  1340 08/24/24  2350   RBC 3.43*  --    HGB 7.5* 7.2*   HCT 25.8*  --    MCV 75.2*  --     MCH 21.9*  --    MCHC 29.1*  --    RDW 16.3  --    NEPRELIM 7.63  --    WBC 16.0*  --    .0*  --        Recent Labs   Lab 08/24/24  1340   ALT 25   AST 9*         Assessment and Plan:   -Patient with marked iron deficiency anemia in the setting of vaginal bleeding as well as hematochezia;   - will look to evaluate with bidirectional endoscopy  -Serial hemoglobin; transfuse to goal hemoglobin greater than 7  -Continue PPI  -Agree with OB/GYN consult and pelvic ultrasound  -Clear liquid diet; GoLytely prep; n.p.o. at midnight for EGD and colonoscopy          Thank you for the consultation, we will follow the patient with you.    Deniz Flores MD  1:36 AM  8/25/2024  [facility]astroenter[facility]  [phone]

## 2024-08-26 ENCOUNTER — ANESTHESIA EVENT (OUTPATIENT)
Dept: ENDOSCOPY | Facility: HOSPITAL | Age: 39
End: 2024-08-26
Payer: COMMERCIAL

## 2024-08-26 ENCOUNTER — ANESTHESIA (OUTPATIENT)
Dept: ENDOSCOPY | Facility: HOSPITAL | Age: 39
End: 2024-08-26
Payer: COMMERCIAL

## 2024-08-26 VITALS
HEIGHT: 67 IN | SYSTOLIC BLOOD PRESSURE: 146 MMHG | DIASTOLIC BLOOD PRESSURE: 76 MMHG | HEART RATE: 60 BPM | TEMPERATURE: 98 F | RESPIRATION RATE: 18 BRPM | BODY MASS INDEX: 41.17 KG/M2 | OXYGEN SATURATION: 100 % | WEIGHT: 262.31 LBS

## 2024-08-26 LAB
HCG SERPL QL: NEGATIVE
HGB BLD-MCNC: 8.8 G/DL
HGB BLD-MCNC: 8.9 G/DL

## 2024-08-26 PROCEDURE — 0DB78ZX EXCISION OF STOMACH, PYLORUS, VIA NATURAL OR ARTIFICIAL OPENING ENDOSCOPIC, DIAGNOSTIC: ICD-10-PCS | Performed by: INTERNAL MEDICINE

## 2024-08-26 PROCEDURE — 99239 HOSP IP/OBS DSCHRG MGMT >30: CPT | Performed by: INTERNAL MEDICINE

## 2024-08-26 PROCEDURE — 0DB58ZX EXCISION OF ESOPHAGUS, VIA NATURAL OR ARTIFICIAL OPENING ENDOSCOPIC, DIAGNOSTIC: ICD-10-PCS | Performed by: INTERNAL MEDICINE

## 2024-08-26 PROCEDURE — 0DJD8ZZ INSPECTION OF LOWER INTESTINAL TRACT, VIA NATURAL OR ARTIFICIAL OPENING ENDOSCOPIC: ICD-10-PCS | Performed by: INTERNAL MEDICINE

## 2024-08-26 PROCEDURE — 0DB98ZX EXCISION OF DUODENUM, VIA NATURAL OR ARTIFICIAL OPENING ENDOSCOPIC, DIAGNOSTIC: ICD-10-PCS | Performed by: INTERNAL MEDICINE

## 2024-08-26 RX ORDER — ONDANSETRON 2 MG/ML
4 INJECTION INTRAMUSCULAR; INTRAVENOUS EVERY 6 HOURS PRN
Status: DISCONTINUED | OUTPATIENT
Start: 2024-08-26 | End: 2024-08-26 | Stop reason: HOSPADM

## 2024-08-26 RX ORDER — PANTOPRAZOLE SODIUM 40 MG/1
40 TABLET, DELAYED RELEASE ORAL
Status: DISCONTINUED | OUTPATIENT
Start: 2024-08-27 | End: 2024-08-26

## 2024-08-26 RX ORDER — PROCHLORPERAZINE EDISYLATE 5 MG/ML
5 INJECTION INTRAMUSCULAR; INTRAVENOUS EVERY 8 HOURS PRN
Status: DISCONTINUED | OUTPATIENT
Start: 2024-08-26 | End: 2024-08-26 | Stop reason: HOSPADM

## 2024-08-26 RX ORDER — NALOXONE HYDROCHLORIDE 0.4 MG/ML
0.08 INJECTION, SOLUTION INTRAMUSCULAR; INTRAVENOUS; SUBCUTANEOUS AS NEEDED
Status: DISCONTINUED | OUTPATIENT
Start: 2024-08-26 | End: 2024-08-26 | Stop reason: HOSPADM

## 2024-08-26 RX ORDER — LIDOCAINE HYDROCHLORIDE 10 MG/ML
INJECTION, SOLUTION EPIDURAL; INFILTRATION; INTRACAUDAL; PERINEURAL AS NEEDED
Status: DISCONTINUED | OUTPATIENT
Start: 2024-08-26 | End: 2024-08-26 | Stop reason: SURG

## 2024-08-26 RX ORDER — HYDROMORPHONE HYDROCHLORIDE 1 MG/ML
0.6 INJECTION, SOLUTION INTRAMUSCULAR; INTRAVENOUS; SUBCUTANEOUS EVERY 5 MIN PRN
Status: DISCONTINUED | OUTPATIENT
Start: 2024-08-26 | End: 2024-08-26 | Stop reason: HOSPADM

## 2024-08-26 RX ORDER — HYDROMORPHONE HYDROCHLORIDE 1 MG/ML
0.4 INJECTION, SOLUTION INTRAMUSCULAR; INTRAVENOUS; SUBCUTANEOUS EVERY 5 MIN PRN
Status: DISCONTINUED | OUTPATIENT
Start: 2024-08-26 | End: 2024-08-26 | Stop reason: HOSPADM

## 2024-08-26 RX ORDER — SODIUM CHLORIDE, SODIUM LACTATE, POTASSIUM CHLORIDE, CALCIUM CHLORIDE 600; 310; 30; 20 MG/100ML; MG/100ML; MG/100ML; MG/100ML
INJECTION, SOLUTION INTRAVENOUS CONTINUOUS PRN
Status: DISCONTINUED | OUTPATIENT
Start: 2024-08-26 | End: 2024-08-26 | Stop reason: SURG

## 2024-08-26 RX ORDER — SODIUM CHLORIDE, SODIUM LACTATE, POTASSIUM CHLORIDE, CALCIUM CHLORIDE 600; 310; 30; 20 MG/100ML; MG/100ML; MG/100ML; MG/100ML
INJECTION, SOLUTION INTRAVENOUS CONTINUOUS
Status: DISCONTINUED | OUTPATIENT
Start: 2024-08-26 | End: 2024-08-26

## 2024-08-26 RX ORDER — PANTOPRAZOLE SODIUM 40 MG/1
40 TABLET, DELAYED RELEASE ORAL
Qty: 30 TABLET | Refills: 1 | Status: SHIPPED | OUTPATIENT
Start: 2024-08-27

## 2024-08-26 RX ORDER — HYDROMORPHONE HYDROCHLORIDE 1 MG/ML
0.2 INJECTION, SOLUTION INTRAMUSCULAR; INTRAVENOUS; SUBCUTANEOUS EVERY 5 MIN PRN
Status: DISCONTINUED | OUTPATIENT
Start: 2024-08-26 | End: 2024-08-26 | Stop reason: HOSPADM

## 2024-08-26 RX ADMIN — SODIUM CHLORIDE, SODIUM LACTATE, POTASSIUM CHLORIDE, CALCIUM CHLORIDE: 600; 310; 30; 20 INJECTION, SOLUTION INTRAVENOUS at 10:41:00

## 2024-08-26 RX ADMIN — LIDOCAINE HYDROCHLORIDE 25 MG: 10 INJECTION, SOLUTION EPIDURAL; INFILTRATION; INTRACAUDAL; PERINEURAL at 10:42:00

## 2024-08-26 NOTE — DISCHARGE SUMMARY
[facility]     Ghada Ramos Patient Status:  Inpatient    1985 MRN [id number]   Location [facility] 4NW-A Attending Zeke Egan MD   Hosp Day # 2 PCP Maurizio Madrid DO     Date of Admission: 2024  Date of Discharge:   2024      Discharge Disposition: Home or Self Care    Discharge Diagnosis:  ***    History of Present Illness: ***    Brief Synopsis: ***    Lace+ Score: 27  59-90 High Risk  29-58 Medium Risk  0-28   Low Risk       TCM Follow-Up Recommendation:  {Care Managers will evaluate the need for follow-up for all patients ages 50+, and high/moderate risk patients ages 25-49. Low risk patients (LACE < 29) will only be evaluated if the \"Still recommend for TCM follow-up\" option is selected from this list.:[id number]}      Procedures during hospitalization:   ***    Incidental or significant findings and recommendations (brief descriptions):  ***    Lab/Test results pending at Discharge:   ***    Consultants:  ***    Discharge Medication List:     Discharge Medications        START taking these medications        Instructions Prescription details   pantoprazole 40 MG Tbec  Commonly known as: Protonix  Start taking on: 2024      Take 1 tablet (40 mg total) by mouth every morning before breakfast.   Quantity: 30 tablet  Refills: 1            CONTINUE taking these medications        Instructions Prescription details   albuterol 108 (90 Base) MCG/ACT Aers  Commonly known as: Ventolin HFA      Inhale 2 puffs into the lungs every 4 (four) hours as needed for Wheezing.   Stop taking on: 2024  Quantity: 1 each  Refills: 0     ALPRAZolam 0.5 MG Tabs  Commonly known as: Xanax      Take 1 tablet (0.5 mg total) by mouth 3 (three) times daily as needed for Sleep or Anxiety.   Refills: 0     benzonatate 100 MG Caps  Commonly known as: Tessalon      Take 1 capsule (100 mg total) by mouth 3 (three) times daily as needed for cough.   Stop taking on:  September 14, 2024  Quantity: 30 capsule  Refills: 0     cyclobenzaprine 10 MG Tabs  Commonly known as: Flexeril      Take 1 tablet (10 mg total) by mouth 3 (three) times daily as needed for Muscle spasms.   Refills: 0     guaiFENesin-codeine 100-10 MG/5ML Soln  Commonly known as: Robitussin AC      Take 10 mL by mouth every 6 (six) hours as needed.   Quantity: 180 mL  Refills: 0     hydroCHLOROthiazide 25 MG Tabs      Take 1 tablet (25 mg total) by mouth daily.   Refills: 0     losartan 100 MG Tabs  Commonly known as: Cozaar      Take 1 tablet (100 mg total) by mouth daily.   Refills: 0     montelukast 10 MG Tabs  Commonly known as: Singulair      Take 1 tablet (10 mg total) by mouth nightly.   Refills: 0     oxymetazoline 0.05 % Soln  Commonly known as: Nasal Decongestant      1 spray by Nasal route 2 (two) times daily.   Stop taking on: September 14, 2024  Quantity: 15 mL  Refills: 0     polyethylene glycol (PEG 3350) 17 g Pack  Commonly known as: Miralax      Take 17 g by mouth daily as needed (constipation).   Quantity: 30 each  Refills: 0     potassium chloride 10 MEQ Tbcr  Commonly known as: Klor-Con M10      Take 1 tablet (10 mEq total) by mouth daily.   Refills: 0     pseudoephedrine 30 MG Tabs  Commonly known as: Sudafed      Take 1 tablet (30 mg total) by mouth in the morning, at noon, and at bedtime.   Quantity: 15 tablet  Refills: 0            STOP taking these medications      Aleve 220 MG Tabs  Generic drug: naproxen        Omeprazole 40 MG Cpdr        predniSONE 20 MG Tabs  Commonly known as: Deltasone                  Where to Get Your Medications        These medications were sent to [id number] - [place], [place] - [address] AT [facility] OF [address], [phone], [phone]  [address], [place] [address]      Hours: 24-hours Phone: [phone]   pantoprazole 40 MG [facility]         [place] reviewed: ***    Follow-up appointment:   Mainor Resendiz,   [address] [place] [address]  [phone]    Follow up in 1 week(s)  Follow up in 1 week    Julius DO Maurizio  [address]  [facility] [address] [phone]    Follow up in 1 week(s)  Follow up    Appointments for Next 30 Days 8/26/2024 - 9/25/2024      None            Vital signs:  Temp:  [98 °F (36.7 °C)-98.4 °F (36.9 °C)] 98.3 °F (36.8 °C)  Pulse:  [58-82] 58  Resp:  [16] 16  BP: (116-157)/(63-80) 118/80  SpO2:  [98 %-100 %] 100 %    Physical Exam:    General: No acute distress   Lungs: clear to auscultation  Cardiovascular: S1, S2  Abdomen: Soft  ***    -----------------------------------------------------------------------------------------------  PATIENT DISCHARGE INSTRUCTIONS: See electronic chart    Zeke Egan MD    Total time spent on discharge planning:  *** minutes     The 21st Century Cures Act makes medical notes like these available to patients in the interest of transparency. Please be advised this is a medical document. Medical documents are intended to carry relevant information, facts as evident, and the clinical opinion of the practitioner. The medical note is intended as peer to peer communication and may appear blunt or direct. It is written in medical language and may contain abbreviations or verbiage that are unfamiliar.

## 2024-08-26 NOTE — DIETARY NOTE
[facility]   part of [facility]   CLINICAL NUTRITION    Ghada Ramos     Admitting diagnosis:  GARCIA (dyspnea on exertion) [R06.09]  Symptomatic anemia [D64.9]    Ht: 170.2 cm (5' 7\")  Wt: 119 kg (262 lb 4.8 oz).   Body mass index is 41.08 kg/m².  IBW: 64 kg    Wt Readings from Last 6 Encounters:   08/24/24 119 kg (262 lb 4.8 oz)   08/15/24 117.9 kg (260 lb)   05/27/24 122.5 kg (270 lb)   07/16/22 120 kg (264 lb 8.8 oz)   11/17/21 120.2 kg (265 lb)   06/08/21 127 kg (280 lb)        Labs/Meds reviewed    Diet:       Procedures    Low Fiber/Soft diet Low Fiber/Soft; Is Patient on Accuchecks? No     Percent Meals Eaten (last 3 days)       Date/Time Percent Meals Eaten (%)    08/24/24 1806 100 %            08/26/24 39 yo female. Presented with fatigue and SOB. Admitted with symptomatic anemia. Pt with hx of anemia that has been attributed to heavy menses. Reported colonoscopy dome 10 yrs ago revealed hemorrhoids.Pt is off unit for EGD/Colonoscopy.   Colonoscopy revealed grade 2 internal hemorrhoids. EGD normal, biopsy taken.  OB/Gyn consulted.   MST of 2 triggered to be seen today. Visited patient who reports good appetite and suspects weight loss is fluid related vs dry weight loss.     Pmhx - Hemorrhoids, constipation, GERD, asthma, HTN.     Patient is at low nutrition risk at this time.    Please consult if patient status changes or nutrition issues arise.    Monik Meneses RDN, LDN, [facility]  Clinical Dietitian   [id number]

## 2024-08-26 NOTE — ANESTHESIA POSTPROCEDURE EVALUATION
[facility]    Ghada Ramos Patient Status:  Inpatient   Age/Gender 38 year old female MRN [id number]   Location [facility] Attending Zeke Egan MD   Hosp Day # 2 PCP Maurizio Madrid DO       Anesthesia Post-op Note    ESOPHAGOGASTRODUODENOSCOPY (EGD) WITH BIOPSY, COLONOSCOPY    Procedure Summary       Date: 08/26/24 Room / Location:  ENDOSCOPY 02 / [facility]    Anesthesia Start: 1041 Anesthesia Stop: 1113    Procedures:       ESOPHAGOGASTRODUODENOSCOPY (EGD) WITH BIOPSY, COLONOSCOPY      COLONOSCOPY Diagnosis:       Anemia      (NORMAL EGD, HEMORRHOIDS)    Surgeons: José Miguel Braden MD Anesthesiologist: Dale Traroe MD    Anesthesia Type: MAC ASA Status: 2            Anesthesia Type: MAC    Vitals Value Taken Time   /65 08/26/24 1114   Temp 98.3 °F (36.8 °C) 08/26/24 1113   Pulse 69 08/26/24 1113   Resp 16 08/26/24 1113   SpO2 98 % 08/26/24 1113   Vitals shown include unfiled device data.    Patient Location: Endoscopy    Anesthesia Type: MAC    Airway Patency: patent    Postop Pain Control: adequate    Mental Status: mildly sedated but able to meaningfully participate in the post-anesthesia evaluation    Nausea/Vomiting: none    Cardiopulmonary/Hydration status: stable euvolemic    Complications: no apparent anesthesia related complications    Postop vital signs: stable    Dental Exam: Unchanged from Preop

## 2024-08-26 NOTE — OPERATIVE REPORT
Ghada Ramos Patient Status:  Inpatient    1985 MRN [id number]   [facility] Attending Zeke Egan MD   Date 2024 PCP Maurizio Madrid DO     PREOPERATIVE DIAGNOSIS/INDICATION: BREEZY  POSTOPERTATIVE DIAGNOSIS: Normal  PROCEDURE PERFORMED: EGD with biopsy  TIME OUT WAS PERFORMED    SEDATION: MAC sedation provided by General Anesthesia    INFORMED CONSENT: Risks, benefits and alternatives to the procedure were explained to the patient including but not limited to bleeding, infection, perforation, adverse drug reactions, pancreatitis and the need for hospitalization and surgery if this occurs, the patient understands and agrees to procedure.  PROCEDURE DESCRIPTION: A regular upper endoscope was introduced into the patient’s mouth, hypo pharynx, esophagus, stomach and the first and second portion of the duodenum, retroflexion of the endoscope was performed in the stomach. Careful examination of the above described areas was performed on withdrawal of the endoscope. The patient tolerated the procedure well, there were no immediate complication immediately following the procedure, and the patient was transferred to recovery in stable condition.  FINDINGS:  ESOPHAGUS:  Normal  EGJ: Normal  STOMACH: Normal  DUODENUM: Normal  THERAPEUTICS: Cold forceps biopsies were performed from duodenum, antrum, esophagus  RECOMMENDATIONS:   Post EGD precautions, watch for bleeding, infection, perforation, adverse drug reactions   Follow biopsies.  Antireflux measures  Daily PPI's  Iron replacement therapy    José Miguel Braden MD  2024  11:15 AM

## 2024-08-26 NOTE — PROGRESS NOTES
NURSING DISCHARGE NOTE    Discharged Home via Wheelchair.  Accompanied by Support staff  Belongings Taken by patient/family.    Pt discharged home today via car w/ family member. AVS discussed w/ pt. All belongings sent w/ pt.

## 2024-08-26 NOTE — PLAN OF CARE
Pt A/O x4. VSS. Afebrile. Pt denied pain throughout day. Medications admin per MAR. Pt's hgb stable at 8.9. Pt had EGD/colonoscopy per order. Pt tolerated diet post procedure. No evidence of bleeding. Pt cleared for DC by all consults. Pt spoke w/ Dr. Resendiz from OB/GYN. Pt in agreement w/ POC/DC. Fall and safety precautions in place. Call light within reach.

## 2024-08-26 NOTE — PROGRESS NOTES
Completed second unit of PRBC without difficulty.  Completed golytely.  No complaints of pain.  Stools are greenish brown.

## 2024-08-26 NOTE — PAYOR COMM NOTE
--------------  ADMISSION REVIEW     Payor: [employer]   Subscriber #:  [id number]  Authorization Number: [id number]    Admit date: 8/24/24  Admit time:  4:38 PM       REVIEW DOCUMENTATION:     ED Provider Notes        ED Provider Notes signed by Moreno Causey DO at 8/24/2024  6:46 PM    Patient Seen in: [facility]      History     Chief Complaint   Patient presents with    Abnormal Result     Stated Complaint: wed had blood work, hgb 8.5 - having SOB    Subjective:   HPI    This is a 38-year-old female presents to the Emergency Department for evaluation of anemia.  Patient reports she has a history of anemia which has been attributed to heavy menses, she had blood work on Wednesday as she has been having some shortness of breath when she exerts herself, hemoglobin was 8.5.  Patient does admit that she will feel some heaviness in her chest when she exerts himself as well.  Currently patient denies either symptom of shortness of breath or chest discomfort.  Denies feel lightheaded or dizzy.  Denies nausea or vomiting.  Patient denies melena or hematochezia.  Denies urinary symptoms.  Patient reports she is currently being treated for respiratory infection, is on day #8 of prednisone and day #4 of Augmentin.    Objective:   Past Medical History:    Anemia    Asthma (HCC)    Back pain    Constipation    Essential hypertension    Gastrointestinal bleed    10yrs back  per pt and colonoscopy at that time showed hemorhoids per pt       Review of Systems    Positive for stated Chief Complaint: Abnormal Result    Other systems are as noted in HPI.  Constitutional and vital signs reviewed.      All other systems reviewed and negativExcept as noted above.    Physical Exam     ED Triage Vitals [08/24/24 1153]   BP (!) 166/93   Pulse 94   Resp 18   Temp 97.2 °F (36.2 °C)   Temp src Temporal   SpO2 100 %   O2 Device None (Room air)       Current Vitals:   Vital Signs  BP: 146/86  Pulse: 84  Resp: 18  Temp: 98.5  °F (36.9 °C)  Temp src: Oral  MAP (mmHg): (!) 104 (RN notified)    Oxygen Therapy  SpO2: 100 %  O2 Device: None (Room air)  Pulse Oximetry Type: Continuous            Physical Exam    GENERAL: Patient is awake, alert  HEENT:  no scleral icterus.  Mucous membranes are moist  HEART: Regular rate and rhythm, no murmurs.  LUNGS: Clear to auscultation bilaterally.  No Rales, no rhonchi, no wheezing, no stridor.  ABDOMEN: Soft, nondistended,non tender  Rectal exam: Performed with PCT Carri as chaperone-guaiac negative brown stool  EXTREMITIES: No peripheral edema, no calf tenderness        ED Course     Labs Reviewed   CBC WITH DIFFERENTIAL WITH PLATELET - Abnormal; Notable for the following components:       Result Value    WBC 16.0 (*)     RBC 3.43 (*)     HGB 7.5 (*)     HCT 25.8 (*)     .0 (*)     MCV 75.2 (*)     MCH 21.9 (*)     MCHC 29.1 (*)     Lymphocyte Absolute 6.71 (*)     Monocyte Absolute 1.29 (*)     All other components within normal limits   COMP METABOLIC PANEL (14) - Abnormal; Notable for the following components:    Glucose 69 (*)     Potassium 3.3 (*)     AST 9 (*)     Albumin 3.3 (*)     All other components within normal limits   PRO BETA NATRIURETIC PEPTIDE - Abnormal; Notable for the following components:    Pro-Beta Natriuretic Peptide 141 (*)     All other components within normal limits   RBC MORPHOLOGY SCAN - Abnormal; Notable for the following components:    RBC Morphology See morphology below (*)     All other components within normal limits   POCT GLUCOSE - Abnormal; Notable for the following components:    POC Glucose 111 (*)     All other components within normal limits   HCG, BETA SUBUNIT, QUAL - Normal   TROPONIN I HIGH SENSITIVITY - Normal   PROTHROMBIN TIME (PT) - Normal   PTT, ACTIVATED - Normal   SCAN SLIDE   PATH COMMENT CBC   IRON AND TIBC   FERRITIN   TYPE AND SCREEN    Narrative:     The following orders were created for panel order Type and screen.  Procedure                                Abnormality         Status                     ---------                               -----------         ------                     ABORH (Blood Type)[[id number]]                               Final result               Antibody Screen[[id number]]                                  Final result                 Please view results for these tests on the individual orders.   ABORH (BLOOD TYPE)   ANTIBODY SCREEN   ABORH CONFIRMATION   RAINBOW DRAW BLUE     EKG    Rate, intervals and axes as noted on EKG Report.  Rate: 92  Rhythm: Sinus Rhythm  Reading: Normal sinus rhythm.  No ST elevation.    MDM        Differential diagnosis before testing includes but not limited to symptomatic anemia, pneumonia, GI bleed, electrolyte abnormality, which is a medical condition that poses a threat to life/function    Radiographic images  I personally reviewed the radiographs and my individual interpretation shows chest x-ray no pneumothorax  I also reviewed the official reports that showed chest x-ray no acute disease per radiologist    Discussion of management (consult/physicians, social work, pharmacy,ect) [facility]      Course of Events during Emergency Room Visit include, placed on cardiac monitor and pulse ox.  Chemistry sodium 141 potassium 3.3 BUN 15 creatinine 0.82 glucose 69.  Negative pregnancy test.  CBC white count 16.0 hemoglobin 7.5 platelet 592.  Patient presents with dyspnea on exertion with chest discomfort consistent with symptomatic anemia, patient reports a history of heavy menses.  Patient does have elevated white blood cell count which is likely due to recent steroid use, there is no evidence of acute infectious process on exam at this time.  Discussed with hospitalist physician will transfer [facility] for further evaluation and admission.    Shared decision making was utilized           Disposition:    Admission  I have discussed with the patient the results  of test, differential diagnosis, and treatment plan. They expressed clear understanding of these instructions and agrees to the plan provided.       Note to patient: The 21st Century Cures Act makes medical notes like these available to patients in the interest of transparency. However, this is a medical document intended as peer to peer communication. It is written in medical language and may contain abbreviations or verbiage that are unfamiliar. It may appear blunt or direct. Medical documents are intended to carry relevant information, facts as evident, and the clinical opinion of the practitioner.           Admission disposition: 8/24/2024  2:54 PM       Medical Decision Making      Disposition and Plan     Clinical Impression:  1. Symptomatic anemia    2. GARCIA (dyspnea on exertion)         Disposition:  Admit  8/24/2024  2:54 pm    Hospital Problems       Present on Admission  Date Reviewed: 6/11/2021            ICD-10-CM Noted POA    * (Principal) Symptomatic anemia D64.9 8/24/2024 Yes    Benign essential HTN I10 Unknown Yes    GARCIA (dyspnea on exertion) R06.09 8/24/2024 Yes    Gastroesophageal reflux disease K21.9 8/24/2024 Unknown    Menorrhagia with regular cycle N92.0 8/24/2024 Unknown    Morbid obesity with BMI of 40.0-44.9, adult (HCC) E66.01, Z68.41 Unknown Yes         Signed by Moreno Causey DO on 8/24/2024  6:46 PM           History and Physical    H&P signed by Marisela Reyes MD at 8/24/2024  5:44 PM     [facility]                                                               History & Physical        Chief Complaint: Fatigue, shortness of breath, abnormal lab with low hemoglobin     History of Present Illness:  Ghada Ramos is a 38 year old female admitted with fatigue, shortness of breath.  Patient states she has had heavy menstrual bleed on and off for the last 6 months.  Patient states she had had similar episode 3 years back after COVID vaccination which resolved at that time.  Patient  states she also had history of GI bleed 10 years back for which she had colonoscopy at that time which showed hemorrhoids.  Patient states she has history of constipation and hemorrhoids.  Patient states she had recent upper respiratory infection for which she has been taking Augmentin and prednisone along with albuterol inhalers as needed.  Has taken naproxen as needed previously.  Complains of heartburns.  Takes omeprazole at home on and off for the heartburns according to patient.  Patient states last menstrual period was last month when  she had 2 weeks of menstrual bleeding at that time.  Patient states current episode only had a few spots and did not have any heavy..  Patient states she did notice that she was passing clots when she was having bowel movements also.  Complains of fatigue.  Complains of shortness of breath more on exertion.  Chest pain and constant chest discomfort .  Denies any fever or chills.  Patient states she has had pelvic ultrasound in the past for heavy periods which did not show any abnormality at that time, only showed a dominant ovarian follicle, epic chart reviewed which showed last pelvic ultrasound done on 5/27/2024  Showing the same.  Patient states she has history of hypertension and ran out of her blood pressure pills and has not taken that for some time.     ASSESSMENT / PLAN:      #Symptomatic anemia with exertional shortness of breath and fatigue and dizziness   #GERD, passing clots during bowel movement according to patient, possible GI bleed, history of GI bleed  # menorrhagia  Follow hemoglobin to 8 hours  Will transfuse if hemoglobin less than 7  Check iron studies hold   home prednisone  We will give IV PPI twice daily  No nonsteroidal anti-inflammatories, this was discussed with patient  Will get GI and OB/GYN on consult with patient's history of GI bleed and also with history of recent menorrhagia   #recent URI   #mild intermittent asthma   continue home albuterol  inhalers as needed  Continue home Singulair  Hold off on any oral steroids- recently took a course of prednisone and Augmentin for URI according to patient     #History of constipation and hemorrhoids     #Hypertension   Continue home losartan   hold home hydrochlorothiazide at this time  Follow blood pressure     #history of back pain     #Morbid obesity with a BMI of 41.08     # anxiety  Continue home Xanax as needed            Consult  REPORT OF CONSULTATION     HISTORY OF PRESENT ILLNESS:  The patient is a 38-year-old female.  She is  1, para 1.  She presented to the emergency room yesterday with severe fatigue with dizziness, did not feel well at all, was found to be anemic.  She was found to have a low hemoglobin, 7.1, and hematocrit 25.  States that she has been battling heavy periods.  She was in the ER 1 month ago and had very heavy periods.  An ultrasound was done, and they let her go home.  Patient was being worked up about 1 year ago for endometrial ablation but did not follow through, but patient is ready for any type of definitive treatment.  She does not feel well, is tired of bleeding heavy.       ASSESSMENT:  Patient with symptomatic anemia, previous history of bleeding heavy, was previously in the ER for heavy bleeding.  Hemoglobin is 7.1, was 7.2 yesterday, but does not feel well.     PLAN:  Will transfuse 2 units of blood, repeat her CBC after the second unit.  Discussed options.  I said to her that I think she needs some treatment done.  This is her second time at the hospital, once in the ER and a second time being admitted for severe anemia.  She either needs an ablation or hysterectomy if she is done childbearing.  The risks of these things were discussed with her.       Chief Complaint: SOB, fatieu     Subjective:      Patient without acute events overnight. Still with some vaginal spotting . No abd pain. No N/V. No F/C.      Objective:    Review of Systems:   A comprehensive  review of systems was completed; pertinent positive and negatives stated in subjective.     Vital signs:  Temp:  [97.2 °F (36.2 °C)-98.5 °F (36.9 °C)] 97.7 °F (36.5 °C)  Pulse:  [] 75  Resp:  [17-22] 19  BP: (145-169)/(63-93) 145/76  SpO2:  [100 %] 100 %     Physical Exam:    General: No acute distress  Respiratory: No wheezes, no rhonchi  Cardiovascular: S1, S2, regular rate and rhythm  Abdomen: Soft, Non-tender, non-distended, positive bowel sounds  Neuro: No new focal deficits.   Extremities: No edema        Diagnostic Data:    Labs:        Recent Labs   Lab 08/24/24 1340 08/24/24 2350 08/25/24  0722   WBC 16.0*  --  11.9*   HGB 7.5* 7.2* 7.1*   MCV 75.2*  --  76.4*   .0*  --  531.0*   INR 0.95  --   --                Recent Labs   Lab 08/24/24 1340 08/24/24 2350 08/25/24  0722   GLU 69*  --  70   BUN 15  --  14   CREATSERUM 0.82  --  0.57   CA 8.7  --  8.6*   ALB 3.3*  --   --      --  139   K 3.3* 4.5 4.1     --  106   CO2 31.0  --  32.0   ALKPHO 66  --   --    AST 9*  --   --    ALT 25  --   --    BILT 0.1  --   --    TP 6.5  --   --          Estimated Creatinine Clearance: 132.7 mL/min (based on SCr of 0.57 mg/dL).         Recent Labs   Lab 08/24/24  1340   TROPHS 6             Recent Labs   Lab 08/24/24  1340   PTP 12.7   INR 0.95                     Microbiology     No results found for this visit on 08/24/24.        Imaging: Reviewed in Epic.     Medications:    pantoprazole  40 mg Intravenous Q12H    losartan  100 mg Oral Daily    montelukast  10 mg Oral Nightly    estrogens conjugated  25 mg Intravenous Q8H         Assessment & Plan:       #Symptomatic anemia   #GERD, passing clots during bowel movement according to patient, possible GI bleed, history of GI bleed  # menorrhagia  Trend hgb  Transfuse for hgb below 7  IV PPI BID  GI to eval  ObGYn to eval  Continue premarin  Awaiting decision on endoscopy     #Recent UTI  #mild Asthma  Resume inhalers     #Ess HTN  Continue  home meds     #Morbid Obesity  BMI 41     #Anxiety  Continue home meds        Zeke Egan MD              MEDICATIONS ADMINISTERED IN LAST 1 DAY:  Transfuse RBC       Date Action Dose Route User    8/25/2024 1427 New Bag (none) Intravenous Kayla Parker RN          Transfuse RBC       Date Action Dose Route User    8/25/2024 1828 New Bag (none) Intravenous Kayla Parker RN          ALPRAZolam (Xanax) tab 0.5 mg       Date Action Dose Route User    8/25/2024 2107 Given 0.5 mg Oral Triny Thomas RN          estrogens conjugated (Premarin) injection 25 mg       Date Action Dose Route User    8/26/2024 0700 Given 25 mg Intravenous Triny Thomas RN    8/25/2024 2100 Given 25 mg Intravenous Triny Thomas RN    8/25/2024 1236 Given 25 mg Intravenous Kayla Parker RN          lactated ringers infusion       Date Action Dose Route User    8/26/2024 1041 New Bag (none) Intravenous Dale Traore MD          lidocaine PF (Xylocaine-MPF) 1% injection       Date Action Dose Route User    8/26/2024 1042 Given 25 mg Intravenous Dale Traore MD          losartan (Cozaar) tab 100 mg       Date Action Dose Route User    8/26/2024 0904 Given 100 mg Oral Dominic Vicente RN          montelukast (Singulair) tab 10 mg       Date Action Dose Route User    8/25/2024 2107 Given 10 mg Oral Triny Thomas RN          pantoprazole (Protonix) 40 mg in sodium chloride 0.9% PF 10 mL IV push       Date Action Dose Route User    8/26/2024 0530 Given 40 mg Intravenous Triny Thomas RN    8/25/2024 1819 Given 40 mg Intravenous Kayla Parker RN          polyethylene glycol-electrolyte (Golytely) 236 g oral solution 4,000 mL       Date Action Dose Route User    8/25/2024 1626 Given 4,000 mL Oral Kayla Parker RN          propofol (Diprivan) 10 MG/ML injection       Date Action Dose Route User    8/26/2024 1042 Given 150 mg Intravenous PrDale amezquita MD          propofol (Diprivan) 10 mg/mL  infusion premix       Date Action Dose Route User    8/26/2024 1042 New Bag 100 mcg/kg/min × 119 kg Intravenous Dale Traore MD          sodium chloride 0.9% infusion       Date Action Dose Route User    8/25/2024 1445 New Bag (none) Intravenous Kayla Parker, RN            Vitals (last day)       Date/Time Temp Pulse Resp BP SpO2 Weight O2 Device O2 Flow Rate (L/min) [facility]    08/26/24 0515 98 °F (36.7 °C) 63 16 157/77 100 % -- None (Room air) --     08/25/24 2000 98.1 °F (36.7 °C) 69 16 145/73 100 % -- None (Room air) --     08/25/24 1845 98.4 °F (36.9 °C) 64 -- 139/73 -- -- -- --     08/25/24 1828 98.3 °F (36.8 °C) 71 -- 138/67 -- -- -- --     08/25/24 1715 98.3 °F (36.8 °C) 71 -- 138/75 -- -- -- --     08/25/24 1600 98.3 °F (36.8 °C) 76 -- 152/80 -- -- -- --     08/25/24 1500 98.2 °F (36.8 °C) 70 -- 146/63 -- -- -- --     08/25/24 1445 98.2 °F (36.8 °C) 70 -- 145/68 -- -- -- --     08/25/24 1430 -- 76 -- 142/67 -- -- -- --     08/25/24 1427 98.4 °F (36.9 °C) -- -- 146/74 -- -- -- --     08/25/24 1109 97.4 °F (36.3 °C) 78 20 160/82 100 % -- -- --     08/25/24 0512 97.7 °F (36.5 °C) 75 19 145/76 100 % -- None (Room air) --           CIWA Scores (since admission)       None          Blood Transfusion Record       Product Unit Status Volume Start End            Transfuse RBC       24  [id number]  [id number] Stopped 364 mL 08/25/24 1828 08/25/24 2130       24  [id number]  [id number] Completed 08/25/24 1801 336 mL 08/25/24 1427 08/25/24 1715

## 2024-08-26 NOTE — ANESTHESIA PREPROCEDURE EVALUATION
PRE-OP EVALUATION    Patient Name: Ghada Ramos    Admit Diagnosis: GARCIA (dyspnea on exertion) [R06.09]  Symptomatic anemia [D64.9]    Pre-op Diagnosis: Anemia [D64.9]    ESOPHAGOGASTRODUODENOSCOPY (EGD), COLONOSCOPY    Anesthesia Procedure: ESOPHAGOGASTRODUODENOSCOPY (EGD), COLONOSCOPY  COLONOSCOPY    Surgeons and Role:     * José Miguel Braden MD - Primary    Pre-op vitals reviewed.  Temp: 98 °F (36.7 °C)  Pulse: 63  Resp: 16  BP: 157/77  SpO2: 100 %  Body mass index is 41.08 kg/m².    Current medications reviewed.  Hospital Medications:   [COMPLETED] sodium ferric gluconate (Ferrlecit) 125 mg in sodium chloride 0.9% 100mL IVPB premix  125 mg Intravenous Once    [COMPLETED] sodium chloride 0.9% infusion   Intravenous Once    [COMPLETED] polyethylene glycol-electrolyte (Golytely) 236 g oral solution 4,000 mL  4,000 mL Oral Once    acetaminophen (Tylenol Extra Strength) tab 500 mg  500 mg Oral Q4H PRN    ondansetron (Zofran) 4 MG/2ML injection 4 mg  4 mg Intravenous Q6H PRN    melatonin tab 3 mg  3 mg Oral Nightly PRN    pantoprazole (Protonix) 40 mg in sodium chloride 0.9% PF 10 mL IV push  40 mg Intravenous Q12H    [COMPLETED] potassium chloride (Klor-Con M20) tab 40 mEq  40 mEq Oral Q4H    albuterol (Ventolin HFA) 108 (90 Base) MCG/ACT inhaler 2 puff  2 puff Inhalation Q4H PRN    ALPRAZolam (Xanax) tab 0.5 mg  0.5 mg Oral TID PRN    cyclobenzaprine (Flexeril) tab 10 mg  10 mg Oral TID PRN    losartan (Cozaar) tab 100 mg  100 mg Oral Daily    montelukast (Singulair) tab 10 mg  10 mg Oral Nightly    estrogens conjugated (Premarin) injection 25 mg  25 mg Intravenous Q8H       Outpatient Medications:     Medications Prior to Admission   Medication Sig Dispense Refill Last Dose    ALPRAZolam 0.5 MG Oral Tab Take 1 tablet (0.5 mg total) by mouth 3 (three) times daily as needed for Sleep or Anxiety.   8/23/2024    benzonatate 100 MG Oral Cap Take 1 capsule (100 mg total) by mouth 3 (three) times daily as needed for  cough. 30 capsule 0 Past Week    albuterol 108 (90 Base) MCG/ACT Inhalation Aero Soln Inhale 2 puffs into the lungs every 4 (four) hours as needed for Wheezing. 1 each 0 2024    guaiFENesin-codeine 100-10 MG/5ML Oral Solution Take 10 mL by mouth every 6 (six) hours as needed. 180 mL 0 Past Week    naproxen (ALEVE) 220 MG Oral Tab Take 1-2 tablets (220-440 mg total) by mouth daily.   Past Week    Omeprazole 40 MG Oral Capsule Delayed Release Take 1 capsule (40 mg total) by mouth daily.   2024    Montelukast Sodium 10 MG Oral Tab Take 1 tablet (10 mg total) by mouth nightly.   2024 at 2100    cyclobenzaprine 10 MG Oral Tab Take 1 tablet (10 mg total) by mouth 3 (three) times daily as needed for Muscle spasms.   More than a month    [] predniSONE 20 MG Oral Tab Take 2 tablets (40 mg total) by mouth daily for 5 days. 10 tablet 0     pseudoephedrine 30 MG Oral Tab Take 1 tablet (30 mg total) by mouth in the morning, at noon, and at bedtime. 15 tablet 0 More than a month    oxymetazoline 0.05 % Nasal Solution 1 spray by Nasal route 2 (two) times daily. 15 mL 0 More than a month    [] predniSONE 20 MG Oral Tab Take 2 tablets (40 mg total) by mouth daily for 5 days. 10 tablet 0     polyethylene glycol, PEG 3350, 17 g Oral Powd Pack Take 17 g by mouth daily as needed (constipation). 30 each 0 More than a month    hydroCHLOROthiazide 25 MG Oral Tab Take 1 tablet (25 mg total) by mouth daily.   More than a month    losartan 100 MG Oral Tab Take 1 tablet (100 mg total) by mouth daily.   More than a month    Potassium Chloride ER 10 MEQ Oral Tab CR Take 1 tablet (10 mEq total) by mouth daily.   More than a month       Allergies: Ceclor [cefaclor]      Anesthesia Evaluation    Patient summary reviewed.    Anesthetic Complications           GI/Hepatic/Renal      (+) GERD                           Cardiovascular                (+) obesity  (+) hypertension                                      Endo/Other                                  Pulmonary      (+) asthma         (+) shortness of breath     (+) sleep apnea       Neuro/Psych                                      Past Surgical History:   Procedure Laterality Date          Colonoscopy      Foot surgery Bilateral 2021     Social History     Socioeconomic History    Marital status:    Tobacco Use    Smoking status: Never    Smokeless tobacco: Never   Vaping Use    Vaping status: Never Used   Substance and Sexual Activity    Alcohol use: Yes     Comment: Socially    Drug use: No    Sexual activity: Not Currently     History   Drug Use No     Available pre-op labs reviewed.  Lab Results   Component Value Date    WBC 11.9 (H) 2024    RBC 3.31 (L) 2024    HGB 8.9 (L) 2024    HCT 30.5 (L) 2024    MCV 76.4 (L) 2024    MCH 21.5 (L) 2024    MCHC 28.1 (L) 2024    RDW 16.6 2024    .0 (H) 2024     Lab Results   Component Value Date     2024    K 4.1 2024     2024    CO2 32.0 2024    BUN 14 2024    CREATSERUM 0.57 2024    GLU 70 2024    CA 8.6 (L) 2024     Lab Results   Component Value Date    INR 0.95 2024         Airway      Mallampati: III  Mouth opening: <3 FB  TM distance: < 4 cm  Neck ROM: limited Cardiovascular    Cardiovascular exam normal.  Rhythm: regular  Rate: normal     Dental             Pulmonary    Pulmonary exam normal.                 Other findings              ASA: 2   Plan: MAC  NPO status verified and patient meets guidelines.          Plan/risks discussed with: patient and significant other                Present on Admission:   Symptomatic anemia   GARCIA (dyspnea on exertion)   Morbid obesity with BMI of 40.0-44.9, adult (HCC)   Benign essential HTN

## 2024-08-26 NOTE — PROGRESS NOTES
[facility]   part of [facility]     Hospitalist Progress Note     Ghada Ramos Patient Status:  Inpatient    1985 MRN [id number]   Location [facility] 4NW-A Attending Marisela Reyes MD   Hosp Day # 2 PCP Maurizio Madrid DO     Chief Complaint: SOB, fatieu    Subjective:     Patient without acute events overnight. Pt had EGD/Colonoscopy this morning. Tolerated transfusion. Seen post procedure.     Objective:    Review of Systems:   A comprehensive review of systems was completed; pertinent positive and negatives stated in subjective.    Vital signs:  Temp:  [97.4 °F (36.3 °C)-98.4 °F (36.9 °C)] 98 °F (36.7 °C)  Pulse:  [63-78] 63  Resp:  [16-20] 16  BP: (138-160)/(63-82) 157/77  SpO2:  [100 %] 100 %    Physical Exam:    General: No acute distress  Respiratory: No wheezes, no rhonchi  Cardiovascular: S1, S2, regular rate and rhythm  Abdomen: Soft, Non-tender, non-distended, positive bowel sounds  Neuro: No new focal deficits.   Extremities: No edema      Diagnostic Data:    Labs:  Recent Labs   Lab 24  0722 24  1729 24  2218 24  0138 24  0746   WBC 16.0*  --  11.9*  --   --   --   --    HGB 7.5*   < > 7.1* 8.3* 9.3* 8.8* 8.9*   MCV 75.2*  --  76.4*  --   --   --   --    .0*  --  531.0*  --   --   --   --    INR 0.95  --   --   --   --   --   --     < > = values in this interval not displayed.       Recent Labs   Lab 24  1340 24  0722   GLU 69*  --  70   BUN 15  --  14   CREATSERUM 0.82  --  0.57   CA 8.7  --  8.6*   ALB 3.3*  --   --      --  139   K 3.3* 4.5 4.1     --  106   CO2 31.0  --  32.0   ALKPHO 66  --   --    AST 9*  --   --    ALT 25  --   --    BILT 0.1  --   --    TP 6.5  --   --        Estimated Creatinine Clearance: 132.7 mL/min (based on SCr of 0.57 mg/dL).    Recent Labs   Lab 24  1340   TROPHS 6       Recent Labs   Lab 24  1340   PTP 12.7   INR 0.95                   Microbiology    No results found for this visit on 08/24/24.      Imaging: Reviewed in Epic.    Medications:    pantoprazole  40 mg Intravenous Q12H    losartan  100 mg Oral Daily    montelukast  10 mg Oral Nightly    estrogens conjugated  25 mg Intravenous Q8H       Assessment & Plan:      #Symptomatic anemia   #GERD, passing clots during bowel movement according to patient, possible GI bleed, history of GI bleed  # menorrhagia  Trend hgb, improved with transfusion  Transfuse for hgb below 7  Continue PPI  GI to eval  ObGYn to eval  Continue premarin  S/p EGD/Colon 8/26/2024      #Recent UTI  #mild Asthma  Resume inhalers    #Ess HTN  Continue home meds    #Morbid Obesity  BMI 41    #Anxiety  Continue home meds      Zeke Egan MD    Supplementary Documentation:     Quality:  DVT Mechanical Prophylaxis:   SCDs,    DVT Pharmacologic Prophylaxis   Medication   None                Code Status: Not on file  Pollard: No urinary catheter in place  Pollard Duration (in days):   Central line:    OMAIRA:     Discharge is dependent on: progress  At this point Ms. Ramos is expected to be discharge to: home    The 21st Century Cures Act makes medical notes like these available to patients in the interest of transparency. Please be advised this is a medical document. Medical documents are intended to carry relevant information, facts as evident, and the clinical opinion of the practitioner. The medical note is intended as peer to peer communication and may appear blunt or direct. It is written in medical language and may contain abbreviations or verbiage that are unfamiliar.

## 2024-08-26 NOTE — OPERATIVE REPORT
Ghada Ramos Patient Status:  Inpatient    1985 MRN [id number]   [facility] Attending Zeke Egan MD   Date 2024 PCP Maurizio Madrid DO     PREOPERATIVE DIAGNOSIS/INDICATION: BREEZY  POSTOPERTATIVE DIAGNOSIS: Hemorrhoids  PROCEDURE PERFORMED: COLONOSCOPY  SEDATION: MAC sedation provided by General Anesthesia    TIME OUT WAS PERFORMED    INFORMED CONSENT: Risks, benefits and alternatives to the procedure were explained to the patient including but not limited to bleeding, infection, perforation, adverse drug reactions, pancreatitis and the need for hospitalization and surgery if this occurs, the patient understands and agrees to procedure.  PROCEDURE DESCRIPTION: After careful digital rectal examination a pediatric colonoscope was introduced into the patients rectum, advanced pass the recto sigmoid junction, into the descending colon, splenic flexure, transverse colon, hepatic flexure, ascending colon, cecum and the last 5-10cm of the terminal ileum, confirmed by landmarks, including the appendiceal orifice and ileocecal valve. Careful examination of the above described areas was performed on withdrawal of the endoscope. Retroflexion was performed on the rectum. The patient tolerated the procedure well, there were no immediate complication immediately following the procedure, and the patient was transferred to recovery in stable condition.  QUALITY OF PREPARATION: [place] Bowel Preparation Scale:            -      Right colon 3, Transverse colon 3, Left colon 3   FINDINGS/THERAPEUTICS:  TERMINAL ILEUM: Normal  COLON: Normal  RECTUM: Grade 2 Internal hemorrhoids  RECOMMENDATIONS:   Post Colonoscopy precautions, watch for bleeding, infection, perforation, adverse drug reactions   Capsule endoscopy as outpatient  Iron replacement therapy  Repeat colonoscopy in 10 years.  OK to discharge from GI stand point  Call if any questions    José Miguel Braden,  MD  8/26/2024  11:18 AM

## 2024-08-27 LAB
BLOOD TYPE BARCODE: 9500
UNIT VOLUME: 350 ML

## 2024-08-27 NOTE — PROGRESS NOTES
Date: 2024    To: Ghada Ramos  : 1985    I hope this letter finds you doing well.  I am writing to inform you of the following:     The biopsies obtained at the time of your recent endoscopic procedure were benign and showed no evidence of infection or malignancy.       Please call the office at [phone] if there are any questions.    Regards,    José Miguel Braden M.D.

## 2024-08-27 NOTE — PAYOR COMM NOTE
--------------  DISCHARGE REVIEW    Payor: [employer]   Subscriber #:  [id number]  Authorization Number: [id number]    Admit date: 8/24/24  Admit time:   4:38 PM  Discharge Date: 8/26/2024  4:07 PM     Admitting Physician: Marisela Reyes MD  Attending Physician:  No att. providers found  Primary Care Physician: Maurizio Madrid DO

## 2024-08-31 ENCOUNTER — APPOINTMENT (OUTPATIENT)
Dept: ULTRASOUND IMAGING | Facility: HOSPITAL | Age: 39
End: 2024-08-31
Attending: EMERGENCY MEDICINE
Payer: COMMERCIAL

## 2024-08-31 ENCOUNTER — HOSPITAL ENCOUNTER (EMERGENCY)
Facility: HOSPITAL | Age: 39
Discharge: HOME | End: 2024-09-01
Attending: EMERGENCY MEDICINE
Payer: COMMERCIAL

## 2024-08-31 DIAGNOSIS — N93.8 DUB (DYSFUNCTIONAL UTERINE BLEEDING): Primary | ICD-10-CM

## 2024-08-31 LAB
ALBUMIN SERPL-MCNC: 4.6 G/DL (ref 3.2–4.8)
ALBUMIN/GLOB SERPL: 1.8 {RATIO} (ref 1–2)
ALP LIVER SERPL-CCNC: 67 U/L
ALT SERPL-CCNC: 34 U/L
ANION GAP SERPL CALC-SCNC: 10 MMOL/L (ref 0–18)
AST SERPL-CCNC: 14 U/L (ref ?–34)
B-HCG UR QL: NEGATIVE
BASOPHILS # BLD AUTO: 0.02 X10(3) UL (ref 0–0.2)
BASOPHILS NFR BLD AUTO: 0.2 %
BILIRUB SERPL-MCNC: 0.2 MG/DL (ref 0.3–1.2)
BUN BLD-MCNC: 6 MG/DL (ref 9–23)
CALCIUM BLD-MCNC: 9 MG/DL (ref 8.7–10.4)
CHLORIDE SERPL-SCNC: 108 MMOL/L (ref 98–112)
CO2 SERPL-SCNC: 24 MMOL/L (ref 21–32)
CREAT BLD-MCNC: 0.78 MG/DL
EGFRCR SERPLBLD CKD-EPI 2021: 101 ML/MIN/1.73M2 (ref 60–?)
EOSINOPHIL # BLD AUTO: 0.16 X10(3) UL (ref 0–0.7)
EOSINOPHIL NFR BLD AUTO: 1.4 %
ERYTHROCYTE [DISTWIDTH] IN BLOOD BY AUTOMATED COUNT: 19.5 %
GLOBULIN PLAS-MCNC: 2.5 G/DL (ref 2–3.5)
GLUCOSE BLD-MCNC: 75 MG/DL (ref 70–99)
HCT VFR BLD AUTO: 31.4 %
HGB BLD-MCNC: 9.4 G/DL
IMM GRANULOCYTES # BLD AUTO: 0.04 X10(3) UL (ref 0–1)
IMM GRANULOCYTES NFR BLD: 0.4 %
LYMPHOCYTES # BLD AUTO: 3.55 X10(3) UL (ref 1–4)
LYMPHOCYTES NFR BLD AUTO: 31.7 %
MCH RBC QN AUTO: 23.3 PG (ref 26–34)
MCHC RBC AUTO-ENTMCNC: 29.9 G/DL (ref 31–37)
MCV RBC AUTO: 77.7 FL
MONOCYTES # BLD AUTO: 0.62 X10(3) UL (ref 0.1–1)
MONOCYTES NFR BLD AUTO: 5.5 %
NEUTROPHILS # BLD AUTO: 6.81 X10 (3) UL (ref 1.5–7.7)
NEUTROPHILS # BLD AUTO: 6.81 X10(3) UL (ref 1.5–7.7)
NEUTROPHILS NFR BLD AUTO: 60.8 %
OSMOLALITY SERPL CALC.SUM OF ELEC: 290 MOSM/KG (ref 275–295)
PLATELET # BLD AUTO: 420 10(3)UL (ref 150–450)
POTASSIUM SERPL-SCNC: 3.1 MMOL/L (ref 3.5–5.1)
PROT SERPL-MCNC: 7.1 G/DL (ref 5.7–8.2)
RBC # BLD AUTO: 4.04 X10(6)UL
SODIUM SERPL-SCNC: 142 MMOL/L (ref 136–145)
WBC # BLD AUTO: 11.2 X10(3) UL (ref 4–11)

## 2024-08-31 PROCEDURE — 96374 THER/PROPH/DIAG INJ IV PUSH: CPT

## 2024-08-31 PROCEDURE — 80053 COMPREHEN METABOLIC PANEL: CPT

## 2024-08-31 PROCEDURE — 99285 EMERGENCY DEPT VISIT HI MDM: CPT

## 2024-08-31 PROCEDURE — 85025 COMPLETE CBC W/AUTO DIFF WBC: CPT | Performed by: EMERGENCY MEDICINE

## 2024-08-31 PROCEDURE — 80053 COMPREHEN METABOLIC PANEL: CPT | Performed by: EMERGENCY MEDICINE

## 2024-08-31 PROCEDURE — 93975 VASCULAR STUDY: CPT | Performed by: EMERGENCY MEDICINE

## 2024-08-31 PROCEDURE — 81025 URINE PREGNANCY TEST: CPT

## 2024-08-31 PROCEDURE — 76830 TRANSVAGINAL US NON-OB: CPT | Performed by: EMERGENCY MEDICINE

## 2024-08-31 PROCEDURE — 76856 US EXAM PELVIC COMPLETE: CPT | Performed by: EMERGENCY MEDICINE

## 2024-08-31 PROCEDURE — 85025 COMPLETE CBC W/AUTO DIFF WBC: CPT

## 2024-08-31 RX ORDER — ONDANSETRON 2 MG/ML
4 INJECTION INTRAMUSCULAR; INTRAVENOUS ONCE
Status: COMPLETED | OUTPATIENT
Start: 2024-08-31 | End: 2024-08-31

## 2024-08-31 RX ORDER — POTASSIUM CHLORIDE 1500 MG/1
40 TABLET, EXTENDED RELEASE ORAL ONCE
Status: COMPLETED | OUTPATIENT
Start: 2024-08-31 | End: 2024-08-31

## 2024-08-31 RX ORDER — ONDANSETRON 2 MG/ML
INJECTION INTRAMUSCULAR; INTRAVENOUS
Status: COMPLETED
Start: 2024-08-31 | End: 2024-08-31

## 2024-09-01 VITALS
BODY MASS INDEX: 41 KG/M2 | DIASTOLIC BLOOD PRESSURE: 86 MMHG | OXYGEN SATURATION: 100 % | TEMPERATURE: 97 F | HEART RATE: 74 BPM | WEIGHT: 260 LBS | SYSTOLIC BLOOD PRESSURE: 162 MMHG | RESPIRATION RATE: 18 BRPM

## 2024-09-01 NOTE — ED PROVIDER NOTES
Patient Seen in: [facility] Emergency Department      History     Chief Complaint   Patient presents with    Anemia     Stated Complaint: Pt was recently admitted for low hemoglobin. Pt reports feeling weak, vaginal b*    Subjective:   HPI    Patient is a 38-year-old female presenting to the ED with generalized fatigue, vaginal bleeding.  The patient was recently hospitalized for anemia.  States that she has had irregular, heavy vaginal bleeding for the last month.  She notes that today her heavy bleeding has been intermittent and at times will, and \"gushes\" with clots.  She also had a recent colonoscopy to rule out GI bleeding as a source for her anemia.  She states that this is normal.  Her gynecologist is Dr. Resendiz.  She recently received 2 units of PRBCs and an iron infusion.  She is taking iron tablets 3 times daily.  She recently completed Provera and bleeding had improved.  She was just started on a daily birth control to control the bleeding yesterday however bleeding started again earlier today and she became concerned for recurrent anemia.  Patient states the plan was for outpatient Pap and biopsy as well as likely hysterectomy with Dr. Resendiz.  She states that POCT hemoglobin was performed in the office and it was 8.4.  She does feel lightheaded or dizzy at times with changes in position.  No chest pain or shortness of breath.  No leg edema.    Objective:   Past Medical History:    Anemia    Asthma (HCC)    Back pain    Constipation    Essential hypertension    Gastrointestinal bleed    10yrs back  per pt and colonoscopy at that time showed hemorhoids per pt              Past Surgical History:   Procedure Laterality Date          Colonoscopy      Colonoscopy N/A 2024    Procedure: COLONOSCOPY;  Surgeon: José Miguel Braden MD;  Location:  ENDOSCOPY    Foot surgery Bilateral 2021                Social History     Socioeconomic History    Marital status:    Tobacco Use     Smoking status: Never    Smokeless tobacco: Never   Vaping Use    Vaping status: Never Used   Substance and Sexual Activity    Alcohol use: Yes     Comment: Socially    Drug use: No    Sexual activity: Not Currently     Social Determinants of Health     Food Insecurity: No Food Insecurity (8/24/2024)    Food Insecurity     Food Insecurity: Never true   Transportation Needs: No Transportation Needs (8/24/2024)    Transportation Needs     Lack of Transportation: No   Housing Stability: Low Risk  (8/24/2024)    Housing Stability     Housing Instability: No              Review of Systems    Positive for stated Chief Complaint: Anemia    Other systems are as noted in HPI.  Constitutional and vital signs reviewed.      All other systems reviewed and negative except as noted above.    Physical Exam     ED Triage Vitals   BP 08/31/24 2034 (!) 173/93   Pulse 08/31/24 2034 87   Resp 08/31/24 2034 18   Temp 08/31/24 2032 97.4 °F (36.3 °C)   Temp src 08/31/24 2032 Temporal   SpO2 08/31/24 2034 99 %   O2 Device 08/31/24 2034 None (Room air)       Current Vitals:   Vital Signs  BP: (!) 162/86  Pulse: 74  Resp: 18  Temp: 97.4 °F (36.3 °C)  Temp src: Temporal  MAP (mmHg): (!) 108    Oxygen Therapy  SpO2: 100 %  O2 Device: None (Room air)            Physical Exam  Vitals and nursing note reviewed.   Constitutional:       General: She is not in acute distress.     Appearance: Normal appearance. She is well-developed. She is not ill-appearing or toxic-appearing.   HENT:      Head: Normocephalic and atraumatic.      Right Ear: External ear normal.      Left Ear: External ear normal.      Mouth/Throat:      Mouth: Mucous membranes are moist.      Pharynx: Oropharynx is clear.   Eyes:      Conjunctiva/sclera: Conjunctivae normal.   Cardiovascular:      Rate and Rhythm: Normal rate and regular rhythm.      Heart sounds: Normal heart sounds.   Pulmonary:      Effort: Pulmonary effort is normal.      Breath sounds: Normal breath sounds.    Abdominal:      General: Abdomen is flat. Bowel sounds are normal. There is no distension.      Tenderness: There is no abdominal tenderness.   Genitourinary:     Comments: Minimal blood in the vaginal vault, no clots, no active bleeding was noted.  However there does appear to be a vascular appearing small mass like lesion protruding from the cervix.  Musculoskeletal:      Right lower leg: No edema.      Left lower leg: No edema.   Skin:     General: Skin is warm.      Capillary Refill: Capillary refill takes less than 2 seconds.      Coloration: Skin is pale.      Findings: No rash.      Comments: Patient appears slightly pale   Neurological:      Mental Status: She is alert.   Psychiatric:         Mood and Affect: Mood normal.         Behavior: Behavior normal.               ED Course     Labs Reviewed   CBC WITH DIFFERENTIAL WITH PLATELET - Abnormal; Notable for the following components:       Result Value    WBC 11.2 (*)     HGB 9.4 (*)     HCT 31.4 (*)     MCV 77.7 (*)     MCH 23.3 (*)     MCHC 29.9 (*)     All other components within normal limits   COMP METABOLIC PANEL (14) - Abnormal; Notable for the following components:    Potassium 3.1 (*)     BUN 6 (*)     Bilirubin, Total 0.2 (*)     All other components within normal limits   POCT PREGNANCY URINE - Normal   RAINBOW DRAW LAVENDER   RAINBOW DRAW LIGHT GREEN   RAINBOW DRAW GOLD   RAINBOW DRAW BLUE          ED Course as of 09/01/24 0656  ------------------------------------------------------------  Time: 08/31 2310  Comment: Orthostats are negative.              MDM      History is obtained from patient is a good historian.  Previous records were reviewed.  Patient was recently hospitalized, there is a consult from gynecology.  The patient was complaining of feeling dizzy and lightheaded with standing.  Therefore orthostatic vital signs were also ordered.  The patient's hemoglobin was 7.1.  She had an ultrasound performed May 27 the lining was normal at  that time..  She had a history of heavy vaginal bleeding with a hemoglobin of 7.1 and she was transfused 2 units of blood with her repeat CBC.  The note reported it was her second time being admitted for severe anemia.  They discussed either an ablation or hysterectomy.  Patient does have a known history of hypertension but was not taking her BP meds for some period of time.  GI was also consulted during hospitalization.  EGD and colonoscopy performed.  Testing considered and ordered today include CBC, CMP, UCG.  Will consider type and screen if patient does have anemia requiring transfusion.  Results reviewed.  CBC with a hemoglobin of 9.4, improved.  WBC at 11.2.  Platelet count of 420.  CMP also reviewed.  There is hypokalemia with potassium of 3.1.  This was replaced in the ED orally.  UCG is negative.  Given findings on physical examination, concern for the possibility of uterine prolapse, malignancy, hormonal imbalance causing DU B, anemia due to blood loss, electrolyte disturbance causing fatigue, testing above was ordered as well as pelvic ultrasound.    PELVIC ULTRASOUND WITH DOPPLER INTERROGATION    COMPARISON: Abdomen pelvic CT on 5/20/24    IMPRESSION    UTERUS: Measures 9.0 x 4.4 x 4.7 cm.  Endometrium is thickened up to 1.5 cm.  There is a hypervascularity involving the endometrium posteriorly.  No definite polyp is seen.  Enlarged left periuterine venous plexus.  Given the patient's history, gynecology consultation is suggested for further workup and treatment options.    RIGHT OVARY: Measures 2.3 x 3.6 x 5.1 cm.  Multiple simple appearing adnexal cyst.  Doppler arterial and venous flow was documented.    LEFT OVARY: Measures 2.6 x 1.4 x 2.5 cm.  No dominant cyst.  Doppler arterial and venous flow was documented.    There is a small to moderate amount of cul-de-sac/pelvic fluid with internal echoes, potentially representing blood.    I did discuss pelvic ultrasound results with patient as well as  findings on physical examination and the need for close outpatient follow-up.  Concern regarding findings on pelvic examination regarding masslike lesion protruding from the cervix.    Discussed case with Dr. Resendiz.  Patient will have close outpatient follow-up.  Advised to contact him by his cell phone on Monday to schedule a time to see him in the office on Tuesday.  Patient to return to the ED immediately if any symptoms worsen, persist, or new symptoms develop prior to her follow-up appointment on Tuesday.  Continue pelvic rest and bed rest.  Adequate hydration.  Iron supplementation as instructed.  Vital signs stable.  Patient has hypertension, has been noncompliant with meds per previous notes.  Advise close follow-up with primary care provider for reevaluation of elevated blood pressures as well.  Take all BP medications as prescribed.                         MDM    Disposition and Plan     Clinical Impression:  1. DUB (dysfunctional uterine bleeding)         Disposition:  Discharge  9/1/2024  1:46 am    Follow-up:  Wilmer Resendiz  [address] [place] [id number]  [phone]    Schedule an appointment as soon as possible for a visit in 2 day(s)            Medications Prescribed:  Discharge Medication List as of 9/1/2024  1:47 AM

## 2024-09-01 NOTE — ED INITIAL ASSESSMENT (HPI)
Recently admitted for anemia. Pt began having heavy vaginal bleeding yesterday and reports feeling fatigued. Pt reports she was seen at her PCP office Thursday,  noting that her hgb was 8.4.

## 2024-09-01 NOTE — DISCHARGE INSTRUCTIONS
Call Dr. Resendiz cell phone on Monday to schedule an appointment for close follow-up on Tuesday.  Return to the ED immediately if any symptoms worsen or new symptoms develop before your follow-up

## 2025-03-10 ENCOUNTER — HOSPITAL ENCOUNTER (EMERGENCY)
Age: 40
Discharge: HOME | End: 2025-03-10
Attending: EMERGENCY MEDICINE
Payer: OTHER MISCELLANEOUS

## 2025-03-10 ENCOUNTER — APPOINTMENT (OUTPATIENT)
Dept: GENERAL RADIOLOGY | Age: 40
End: 2025-03-10
Attending: EMERGENCY MEDICINE
Payer: OTHER MISCELLANEOUS

## 2025-03-10 VITALS
OXYGEN SATURATION: 98 % | HEART RATE: 70 BPM | TEMPERATURE: 99 F | HEIGHT: 67 IN | WEIGHT: 260 LBS | SYSTOLIC BLOOD PRESSURE: 157 MMHG | RESPIRATION RATE: 16 BRPM | DIASTOLIC BLOOD PRESSURE: 86 MMHG | BODY MASS INDEX: 40.81 KG/M2

## 2025-03-10 DIAGNOSIS — S93.601A SPRAIN OF RIGHT FOOT, INITIAL ENCOUNTER: Primary | ICD-10-CM

## 2025-03-10 PROCEDURE — 73610 X-RAY EXAM OF ANKLE: CPT | Performed by: EMERGENCY MEDICINE

## 2025-03-10 PROCEDURE — 99283 EMERGENCY DEPT VISIT LOW MDM: CPT

## 2025-03-10 PROCEDURE — 73630 X-RAY EXAM OF FOOT: CPT | Performed by: EMERGENCY MEDICINE

## 2025-03-10 PROCEDURE — 99284 EMERGENCY DEPT VISIT MOD MDM: CPT

## 2025-03-11 NOTE — ED INITIAL ASSESSMENT (HPI)
Patient arrives from home with c/o right ankle injury states rolled while at work this afternoon. Works at [employer].

## 2025-03-11 NOTE — ED PROVIDER NOTES
Patient Seen in: [facility] Emergency Department [facility] [place]      History     Chief Complaint   Patient presents with    Leg or Foot Injury     Stated Complaint: ankle injury    Subjective:   HPI      Patient is a 39-year-old female presents to ED for evaluation of right foot pain.  Patient states she was at work today and inverted her right foot and ankle.  She complains of lateral right foot pain.  Difficulty weightbearing.  States she has crutches at home.  History of foot surgery with pain in her right lateral foot in the past.  Denies other complaints.    Objective:     Past Medical History:    Anemia    Asthma (HCC)    Back pain    Constipation    Essential hypertension    Gastrointestinal bleed    10yrs back  per pt and colonoscopy at that time showed hemorhoids per pt              Past Surgical History:   Procedure Laterality Date          Colonoscopy      Colonoscopy N/A 2024    Procedure: COLONOSCOPY;  Surgeon: José Miguel Braden MD;  Location:  ENDOSCOPY    Foot surgery Bilateral 2021                Social History     Socioeconomic History    Marital status:    Tobacco Use    Smoking status: Never    Smokeless tobacco: Never   Vaping Use    Vaping status: Never Used   Substance and Sexual Activity    Alcohol use: Yes     Comment: Socially    Drug use: No    Sexual activity: Not Currently     Social Drivers of Health     Food Insecurity: No Food Insecurity (2024)    Food Insecurity     Food Insecurity: Never true   Transportation Needs: No Transportation Needs (2024)    Transportation Needs     Lack of Transportation: No   Housing Stability: Low Risk  (2024)    Housing Stability     Housing Instability: No                  Physical Exam     ED Triage Vitals [03/10/25 2238]   /86   Pulse 70   Resp 16   Temp 98.6 °F (37 °C)   Temp src    SpO2 98 %   O2 Device None (Room air)       Current Vitals:   Vital Signs  BP: 157/86  Pulse: 70  Resp: 16  Temp: 98.6 °F  (37 °C)    Oxygen Therapy  SpO2: 98 %  O2 Device: None (Room air)        Physical Exam  Constitutional: Well-appearing in no acute distress  Musculoskeletal: Right proximal fibula nontender.  There is no tenderness of the right medial or lateral malleolus.  She has some mild tenderness to the right lateral foot at the base of the right fifth metatarsal with some mild bruising and swelling in this area.  Forefoot nontender.  Good DP and PT pulses    ED Course   Labs Reviewed - No data to display         I personally reviewed xray films of right foot and right ankle and independent interpretation shows no fracture.  I also reviewed formal xray report as read by radiology with findings below:    XR FOOT, COMPLETE (MIN 3 VIEWS), RIGHT (CPT=73630)    Result Date: 3/10/2025  PROCEDURE:  XR FOOT, COMPLETE (MIN 3 VIEWS), RIGHT (CPT=73630)  TECHNIQUE:  AP, oblique, and lateral views were obtained.  COMPARISON:  [facility], XR, XR ANKLE (MIN 3 VIEWS), RIGHT (CPT=73610), 3/10/2025, 10:57 PM.  INDICATIONS:  ankle injury  PATIENT STATED HISTORY: (As transcribed by Technologist)  Patient has right foot and ankle pain after twisted it today. Patient has lateral aspect pain of both the foot and ankle. Patient has good range of motion. Patient stated weight bearing causes the  pain to worsen. Patient has history of bilateral foot surgery in 2019 and 2021.    FINDINGS:  BONES:  There is a metallic screw identified within the proximal half of the 5th metatarsal.  The fracture is transfixing an old fracture deformity of the proximal diaphysis.  The fracture is healed.  There is no acute fracture, subluxation or dislocation. SOFT TISSUES:  No visible soft tissue swelling. EFFUSION:  None visible. OTHER:  Negative.            CONCLUSION:  Postsurgical changes involving the 5th metatarsal.  No acute fracture.   LOCATION:  [facility]   Dictated by (CST): Bigg Flynn MD on 3/10/2025 at 11:11 PM     Finalized by (CST): Bigg Flynn  MD on 3/10/2025 at 11:13 PM       XR ANKLE (MIN 3 VIEWS), RIGHT (CPT=73610)    Result Date: 3/10/2025  PROCEDURE:  XR ANKLE (MIN 3 VIEWS), RIGHT (CPT=73610)  TECHNIQUE:  Three views were obtained.  COMPARISON:  [facility], XR, XR FOOT, COMPLETE (MIN 3 VIEWS), RIGHT (CPT=73630), 3/10/2025, 10:58 PM.  INDICATIONS:  ankle injury  PATIENT STATED HISTORY: (As transcribed by Technologist)  Patient has right foot and ankle pain after twisted it today. Patient has lateral aspect pain of both the foot and ankle. Patient has good range of motion. Patient stated weight bearing causes the  pain to worsen. Patient has history of bilateral foot surgery in 2019 and 2021.    FINDINGS:  Ankle mortise and talar dome appear intact.  No acute fracture, subluxation or dislocation.  Partially visualized metallic screws seen projecting over the 5th metatarsal.  There is minimal lateral soft tissue swelling.            CONCLUSION:  Mild lateral ankle sprain.  No acute ankle fracture, subluxation or dislocation.   LOCATION:  [facility]   Dictated by (CST): Bigg Flynn MD on 3/10/2025 at 11:10 PM     Finalized by (CST): Bigg Flynn MD on 3/10/2025 at 11:11 PM           MDM      Patient is a 39-year-old female presents to ED for evaluation of right foot and ankle pain.  Main pain is in the right lateral foot.  Differential sprain, fracture.  X-rays obtained of the right foot and ankle negative for fracture.  Symptoms consistent with sprain.  Patient given Ace wrap.  She has crutches at home which she can use as needed.  May take Tylenol or Motrin for pain.    Patient was screened and evaluated during this visit.   As a treating physician attending to the patient, I determined, within reasonable clinical confidence and prior to discharge, that an emergency medical condition was not or was no longer present.  There was no indication for further evaluation, treatment or admission on an emergency basis.  Comprehensive verbal and written  discharge and follow-up instructions were provided to help prevent relapse or worsening.  Patient was instructed to follow-up with her primary care provider for further evaluation and treatment, but to return immediately to the ER for worsening, concerning, new, changing or persisting symptoms.  I discussed the case with the patient and they had no questions, complaints, or concerns.  Patient felt comfortable going home.            Medical Decision Making      Disposition and Plan     Clinical Impression:  1. Sprain of right foot, initial encounter         Disposition:  Discharge  3/10/2025 11:37 pm    Follow-up:  Balbir Crooks MD  [address] [place] [address]  [address]  [facility] [address]  [phone]    Follow up  As needed          Medications Prescribed:  Discharge Medication List as of 3/10/2025 11:41 PM              Supplementary Documentation:

## 2025-03-11 NOTE — DISCHARGE INSTRUCTIONS
Return if further problems    Weight bear as tolerated    Ice and elevate    Tylenol or Motrin for pain    Use crutches which you have at home as needed for ambulation

## (undated) DEVICE — 1200CC GUARDIAN II: Brand: GUARDIAN

## (undated) DEVICE — KIT CUSTOM ENDOPROCEDURE STERIS

## (undated) DEVICE — KIT VLV 5 PC AIR H2O SUCT BX ENDOGATOR CONN

## (undated) DEVICE — KIT MFLD FOR SPEC COLL

## (undated) DEVICE — BITEBLOCK ENDOSCP 60FR MAXI STRP

## (undated) DEVICE — 10FT COMBINED O2 DELIVERY/CO2 MONITORING. FILTER WITH MICROSTREAM TYPE LUER: Brand: DUAL ADULT NASAL CANNULA

## (undated) DEVICE — 3M™ RED DOT™ MONITORING ELECTRODE WITH FOAM TAPE AND STICKY GEL, 50/BAG, 20/CASE, 72/PLT 2570: Brand: RED DOT™

## (undated) DEVICE — GIJAW SINGLE-USE BIOPSY FORCEPS WITH NEEDLE: Brand: GIJAW

## (undated) NOTE — LETTER
Date & Time: 6/20/2020, 12:45 PM  Patient: Digna Dears  Encounter Provider(s):    Sylvain Olmstead MD       To Whom It May Concern:    Alejandro Gomes was seen and treated in our department on 6/20/2020.  She should not return to work until Monday [place]

## (undated) NOTE — ED AVS SNAPSHOT
Hi Ring   MRN: [id number]    Department:  THE [facility] Emergency Department in [place]   Date of Visit:  1/5/2019           Disclosure     Insurance plans vary and the physician(s) referred by the ER may not be covered by your plan.  Please contac tell this physician (or your personal doctor if your instructions are to return to your personal doctor) about any new or lasting problems. The primary care or specialist physician will see patients referred from the [facility] Emergency Department.  Ahmet Liang

## (undated) NOTE — LETTER
[address]  [address]  Authorization for Surgical Operation and Procedure     Date:___________                                                                                                         Time:__________  I hereby authorize Dr. Braden, my physician and his/her assistants (if applicable), which may include medical students, residents, and/or fellows, to perform the following surgical operation/ procedure and administer such anesthesia as may be determined necessary by my physician: Esophagogastroduodenoscopy and colonoscopy on Ghada Ramos   2.   I recognize that during the surgical operation/procedure, unforeseen conditions may necessitate additional or different procedures than those listed above.  I, therefore, further authorize and request that the above-named surgeon, assistants, or designees perform such procedures as are, in their judgment, necessary and desirable.    3.   My surgeon/physician has discussed prior to my surgery the potential benefits, risks and side effects of this procedure; the likelihood of achieving goals; and potential problems that might occur during recuperation.  They also discussed reasonable alternatives to the procedure, including risks, benefits, and side effects related to the alternatives and risks related to not receiving this procedure.  I have had all my questions answered and I acknowledge that no guarantee has been made as to the result that may be obtained.    4.   Should the need arise during my operation/procedure, which includes change of level of care prior to discharge, I also consent to the administration of blood and/or blood products.  Further, I understand that despite careful testing and screening of blood or blood products by collecting agencies, I may still be subject to ill effects as a result of receiving a blood transfusion and/or blood products.  The following are some, but not all, of the potential  risks that can occur: fever and allergic reactions, hemolytic reactions, transmission of diseases such as Hepatitis, AIDS and Cytomegalovirus (CMV) and fluid overload.  In the event that I wish to have an autologous transfusion of my own blood, or a directed donor transfusion, I will discuss this with my physician.  Check only if Refusing Blood or Blood Products  I understand refusal of blood or blood products as deemed necessary by my physician may have serious consequences to my condition to include possible death. I hereby assume responsibility for my refusal and release the hospital, its personnel, and my physicians from any responsibility for the consequences of my refusal.          o  Refuse      5.   I authorize the use of any specimen, organs, tissues, body parts or foreign objects that may be removed from my body during the operation/procedure for diagnosis, research or teaching purposes and their subsequent disposal by hospital authorities.  I also authorize the release of specimen test results and/or written reports to my treating physician on the hospital medical staff or other referring or consulting physicians involved in my care, at the discretion of the Pathologist or my treating physician.    6.   I consent to the photographing or videotaping of the operations or procedures to be performed, including appropriate portions of my body for medical, scientific, or educational purposes, provided my identity is not revealed by the pictures or by descriptive texts accompanying them.  If the procedure has been photographed/videotaped, the surgeon will obtain the original picture, image, videotape or CD.  The hospital will not be responsible for storage, release or maintenance of the picture, image, tape or CD.    7.   I consent to the presence of a  or observers in the operating room as deemed necessary by my physician or their designees.    8.   I recognize that in the event my procedure  results in extended X-Ray/fluoroscopy time, I may develop a skin reaction.    9. If I have a Do Not Attempt Resuscitation (DNAR) order in place, that status will be suspended while in the operating room, procedural suite, and during the recovery period unless otherwise explicitly stated by me (or a person authorized to consent on my behalf). The surgeon or my attending physician will determine when the applicable recovery period ends for purposes of reinstating the DNAR order.  10. Patients having a sterilization procedure: I understand that if the procedure is successful the results will be permanent and it will therefore be impossible for me to inseminate, conceive, or bear children.  I also understand that the procedure is intended to result in sterility, although the result has not been guaranteed.   11. I acknowledge that my physician has explained sedation/analgesia administration to me including the risk and benefits I consent to the administration of sedation/analgesia as may be necessary or desirable in the judgment of my physician.    I CERTIFY THAT I HAVE READ AND FULLY UNDERSTAND THE ABOVE CONSENT TO OPERATION and/or OTHER PROCEDURE.    _  ________________________________________  __________________________________  Signature of Patient     Signature of Responsible Person         ___________________________________         Printed Name of Responsible Person           _________________________________                 Relationship to Patient  _________________________________________  ______________________________  Signature of Witness          Date  Time      Patient Name: Ghada Ramos     : 1985                 Printed: 2024     Medical Record #: [id number]                     Page 1 [address]  [address]    Consent for Anesthesia    I, Ghada Ramos agree to be cared for by an anesthesiologist, who is  specially trained to monitor me and give me medicine to put me to sleep or keep me comfortable during my procedure    I understand that my anesthesiologist is not an employee or agent of [facility] or [employer]. He or she works for [email or web address].    As the patient asking for anesthesia services, I agree to:  Allow the anesthesiologist (anesthesia doctor) to give me medicine and do additional procedures as necessary. Some examples are: Starting or using an “IV” to give me medicine, fluids or blood during my procedure, and having a breathing tube placed to help me breathe when I’m asleep (intubation). In the event that my heart stops working properly, I understand that my anesthesiologist will make every effort to sustain my life, unless otherwise directed by [facility] Do Not Resuscitate documents.  Tell my anesthesia doctor before my procedure:  If I am pregnant.  The last time that I ate or drank.  All of the medicines I take (including prescriptions, herbal supplements, and pills I can buy without a prescription (including street drugs/illegal medications). Failure to inform my anesthesiologist about these medicines may increase my risk of anesthetic complications.  If I am allergic to anything or have had a reaction to anesthesia before.  I understand how the anesthesia medicine will help me (benefits).  I understand that with any type of anesthesia medicine there are risks:  The most common risks are: nausea, vomiting, sore throat, muscle soreness, damage to my eyes, mouth, or teeth (from breathing tube placement).  Rare risks include: remembering what happened during my procedure, allergic reactions to medications, injury to my airway, heart, lungs, vision, nerves, or muscles and in extremely rare instances death.  My doctor has explained to me other choices available to me for my care (alternatives).  Pregnant Patients (“epidural”):  I understand that the risks of having  an epidural (medicine given into my back to help control pain during labor), include itching, low blood pressure, difficulty urinating, headache or slowing of the baby’s heart. Very rare risks include infection, bleeding, seizure, irregular heart rhythms and nerve injury.  Regional Anesthesia (“spinal”, “epidural”, & “nerve blocks”):  I understand that rare but potential complications include headache, bleeding, infection, seizure, irregular heart rhythms, and nerve injury.    I can change my mind about having anesthesia services at any time before I get the medicine.    _____________________________________________________________________________  Patient (or Representative) Signature/Relationship to Patient  Date   Time    _____________________________________________________________________________   Name (if used)    Language/Organization   Time    _____________________________________________________________________________  Anesthesiologist Signature     Date   Time  I have discussed the procedure and information above with the patient (or patient’s representative) and answered their questions. The patient or their representative has agreed to have anesthesia services.    _____________________________________________________________________________  Witness        Date   Time  I have verified that the signature is that of the patient or patient’s representative, and that it was signed before the procedure  Patient Name: Ghada Ramos     : 1985                 Printed: 2024     Medical Record #: [id number]                     Page 2 of 2

## (undated) NOTE — LETTER
[facility]  [address] [facility] [address]  [phone]    Blood Transfusion Consent    In the course of your treatment, it may become necessary to administer a transfusion of blood or blood components. This form provides basic information concerning this procedure and, if signed by you, authorizes its administration. By signing this form, you agree that all of your questions about the administration of blood or blood products have been answered by the ordering medical professional or designee.    Description of Procedure  Blood is introduced into one of your veins, commonly in the arm, using a sterilized disposable needle. The amount of blood transfused, and whether the transfusion will be of blood or blood components is a judgement the physician will make based on your particular needs.    Risks  The transfusion is a common procedure of low risk.  MINOR AND TEMPORARY REACTIONS ARE NOT UNCOMMON, including a slight bruise, swelling or local reaction in the area where the needle pierces your skin, or a nonserious reaction to the transfused material itself, including headache, fever or mild skin reaction, such as rash.  Serious reactions are possible, though very unlikely, and include severe allergic reaction (shock) and destruction (hemolysis) of transfused blood cells.  Infectious diseases which are known to be transmitted by blood transfusion include certain types of viral Hepatitis(liver infection from a virus), Human Immunodeficiency Virus (HIV-1,2) infection, a viral infection known to cause Acquired Immunodeficiency Syndrome (AIDS), as well as certain other bacterial, viral, and parasitic diseases. While a minimal risk of acquiring an infectious disease from transfused blood exists, in accordance with the Federal and State law, all due care has been taken in donor selection and testing to avoid transmission of disease.    Alternatives  If loss of blood poses serious threats during your  treatment, THERE IS NO EFFECTIVE ALTERNATIVE TO BLOOD TRANSFUSION. However, if you have any further questions on this matter, your provider will fully explain the alternatives to you if it has not already been done.    I, ______________________________, have read/had read to me the above. I understand the matters bearing on the decision whether or not to authorize a transfusion of blood or blood components. I have no questions which have not been answered to my full satisfaction. I hereby consent to such transfusion as my physician may deem necessary or advisable in the course of my treatment.    ______________________________________________                    ___________________________  (Signature of Patient or Responsible party in case of minor,                 (Printed Name of Patient or incompetent, or unconscious patient)              Responsible Party)    ___________________________               _____________________  (Relationship to Patient if not self)                                    (Date and Time)    __________________________                                                           ______________________              (Signature of Witness)               (Printed Name of Witness)     Language line ()    Telephone/Verbal/Video Consent    __________________________                     ____________________  (Signature of 2nd Witness           (Printed Name of 2nd  Telephone/Verbal/Video Consent)           Witness)    Patient Name: Ghada Ramos     : 1985                 Printed: 2024     Medical Record #: [id number]      Rev: 2023

## (undated) NOTE — LETTER
Date & Time: 8/15/2024, 1:22 PM  Patient: Ghada Ramos  Encounter Provider(s):    Eric Hale PA-C       To Whom It May Concern:    Ghada Ramos was seen and treated in our department on 8/15/2024. She should not return to work until 08/17/2024 .    If you have any questions or concerns, please do not hesitate to call.        _____________________________  Physician/APC Signature

## (undated) NOTE — LETTER
Date & Time: 9/5/2018, 1:10 PM  Patient: Marleen Mccray  Encounter Provider(s):    Mela Adam MD  [facility]       To Whom It May Concern:    Yarelis Delarosa was seen and treated in our department on 9/5/2018.  She should not return to

## (undated) NOTE — ED AVS SNAPSHOT
dEdie Ling   MRN: [id number]    Department:  THE [facility] Emergency Department in [place]   Date of Visit:  9/5/2018           Disclosure     Insurance plans vary and the physician(s) referred by the ER may not be covered by your plan.  Please contac tell this physician (or your personal doctor if your instructions are to return to your personal doctor) about any new or lasting problems. The primary care or specialist physician will see patients referred from the [facility] Emergency Department.  Salvadore Skiff

## (undated) NOTE — LETTER
Date & Time: 6/20/2020, 1:08 PM  Patient: Marleen Mccray  Encounter Provider(s):    Asad Ya MD       To Whom It May Concern:    Yarelis Delarosa was seen and treated in our department on 6/20/2020.  She can return to work with these limitations: